# Patient Record
Sex: FEMALE | Race: BLACK OR AFRICAN AMERICAN | Employment: FULL TIME | URBAN - NONMETROPOLITAN AREA
[De-identification: names, ages, dates, MRNs, and addresses within clinical notes are randomized per-mention and may not be internally consistent; named-entity substitution may affect disease eponyms.]

---

## 2017-10-20 ENCOUNTER — APPOINTMENT (OUTPATIENT)
Dept: ULTRASOUND IMAGING | Age: 37
End: 2017-10-20
Payer: COMMERCIAL

## 2017-10-20 ENCOUNTER — HOSPITAL ENCOUNTER (EMERGENCY)
Age: 37
Discharge: HOME | End: 2017-10-20
Attending: INTERNAL MEDICINE
Payer: COMMERCIAL

## 2017-10-20 VITALS
RESPIRATION RATE: 16 BRPM | TEMPERATURE: 98.2 F | HEIGHT: 68 IN | OXYGEN SATURATION: 100 % | HEART RATE: 74 BPM | BODY MASS INDEX: 36.22 KG/M2 | SYSTOLIC BLOOD PRESSURE: 133 MMHG | DIASTOLIC BLOOD PRESSURE: 86 MMHG | WEIGHT: 239 LBS

## 2017-10-20 DIAGNOSIS — N89.8 VAGINAL DISCHARGE: Primary | ICD-10-CM

## 2017-10-20 LAB
ANION GAP SERPL CALCULATED.3IONS-SCNC: 12 MEQ/L (ref 8–16)
BASOPHILS # BLD: 0.9 %
BASOPHILS ABSOLUTE: 0.1 THOU/MM3 (ref 0–0.1)
BILIRUBIN URINE: NEGATIVE
BLOOD, URINE: NEGATIVE
BUN BLDV-MCNC: 8 MG/DL (ref 7–22)
CALCIUM SERPL-MCNC: 8.9 MG/DL (ref 8.5–10.5)
CHARACTER, URINE: CLEAR
CHLAMYDIA TRACHOMATIS BY RT-PCR: NOT DETECTED
CHLORIDE BLD-SCNC: 104 MEQ/L (ref 98–111)
CO2: 24 MEQ/L (ref 23–33)
COLOR: YELLOW
CREAT SERPL-MCNC: 0.8 MG/DL (ref 0.4–1.2)
CT/NG SOURCE: NORMAL
EOSINOPHIL # BLD: 1.4 %
EOSINOPHILS ABSOLUTE: 0.1 THOU/MM3 (ref 0–0.4)
GFR SERPL CREATININE-BSD FRML MDRD: > 90 ML/MIN/1.73M2
GLUCOSE BLD-MCNC: 85 MG/DL (ref 70–108)
GLUCOSE, URINE: NEGATIVE MG/DL
HCT VFR BLD CALC: 38.8 % (ref 37–47)
HEMOGLOBIN: 13.2 GM/DL (ref 12–16)
KETONES, URINE: NEGATIVE
KOH PREP: NORMAL
LEUKOCYTE EST, POC: NEGATIVE
LYMPHOCYTES # BLD: 41.9 %
LYMPHOCYTES ABSOLUTE: 3.1 THOU/MM3 (ref 1–4.8)
MCH RBC QN AUTO: 30.4 PG (ref 27–31)
MCHC RBC AUTO-ENTMCNC: 34.1 GM/DL (ref 33–37)
MCV RBC AUTO: 89.2 FL (ref 81–99)
MONOCYTES # BLD: 9.7 %
MONOCYTES ABSOLUTE: 0.7 THOU/MM3 (ref 0.4–1.3)
NEISSERIA GONORRHOEAE BY RT-PCR: NOT DETECTED
NITRITE, URINE: NEGATIVE
NUCLEATED RED BLOOD CELLS: 0 /100 WBC
OSMOLALITY CALCULATION: 277 MOSMOL/KG (ref 275–300)
PDW BLD-RTO: 14.1 % (ref 11.5–14.5)
PH UA: 7
PLATELET # BLD: 253 THOU/MM3 (ref 130–400)
PMV BLD AUTO: 9.2 MCM (ref 7.4–10.4)
POTASSIUM SERPL-SCNC: 4.3 MEQ/L (ref 3.5–5.2)
PREGNANCY, SERUM: NEGATIVE
PROTEIN UA: NEGATIVE MG/DL
RBC # BLD: 4.34 MILL/MM3 (ref 4.2–5.4)
RBC # BLD: NORMAL 10*6/UL
SEG NEUTROPHILS: 46.1 %
SEGMENTED NEUTROPHILS ABSOLUTE COUNT: 3.4 THOU/MM3 (ref 1.8–7.7)
SODIUM BLD-SCNC: 140 MEQ/L (ref 135–145)
SPECIFIC GRAVITY UA: 1.02 (ref 1–1.03)
TRICHOMONAS PREP: NORMAL
UROBILINOGEN, URINE: 0.2 EU/DL
WBC # BLD: 7.3 THOU/MM3 (ref 4.8–10.8)

## 2017-10-20 PROCEDURE — 85025 COMPLETE CBC W/AUTO DIFF WBC: CPT

## 2017-10-20 PROCEDURE — 80048 BASIC METABOLIC PNL TOTAL CA: CPT

## 2017-10-20 PROCEDURE — 81003 URINALYSIS AUTO W/O SCOPE: CPT

## 2017-10-20 PROCEDURE — 99284 EMERGENCY DEPT VISIT MOD MDM: CPT

## 2017-10-20 PROCEDURE — 76830 TRANSVAGINAL US NON-OB: CPT

## 2017-10-20 PROCEDURE — 87491 CHLMYD TRACH DNA AMP PROBE: CPT

## 2017-10-20 PROCEDURE — 87591 N.GONORRHOEAE DNA AMP PROB: CPT

## 2017-10-20 PROCEDURE — 87086 URINE CULTURE/COLONY COUNT: CPT

## 2017-10-20 PROCEDURE — 6370000000 HC RX 637 (ALT 250 FOR IP): Performed by: INTERNAL MEDICINE

## 2017-10-20 PROCEDURE — 84703 CHORIONIC GONADOTROPIN ASSAY: CPT

## 2017-10-20 PROCEDURE — 87220 TISSUE EXAM FOR FUNGI: CPT

## 2017-10-20 PROCEDURE — 36415 COLL VENOUS BLD VENIPUNCTURE: CPT

## 2017-10-20 PROCEDURE — 87210 SMEAR WET MOUNT SALINE/INK: CPT

## 2017-10-20 RX ORDER — METRONIDAZOLE 500 MG/1
500 TABLET ORAL 2 TIMES DAILY
Qty: 20 TABLET | Refills: 0 | Status: SHIPPED | OUTPATIENT
Start: 2017-10-20 | End: 2017-10-30

## 2017-10-20 RX ORDER — METRONIDAZOLE 500 MG/1
500 TABLET ORAL ONCE
Status: COMPLETED | OUTPATIENT
Start: 2017-10-20 | End: 2017-10-20

## 2017-10-20 RX ADMIN — METRONIDAZOLE 500 MG: 500 TABLET ORAL at 15:27

## 2017-10-20 ASSESSMENT — ENCOUNTER SYMPTOMS
SORE THROAT: 0
COUGH: 0
VOMITING: 0
EYE PAIN: 0
DIARRHEA: 0
WHEEZING: 0
BACK PAIN: 0
SHORTNESS OF BREATH: 0
RHINORRHEA: 0
EYE DISCHARGE: 0
NAUSEA: 0
ABDOMINAL PAIN: 1

## 2017-10-20 ASSESSMENT — PAIN DESCRIPTION - DESCRIPTORS
DESCRIPTORS: CRAMPING
DESCRIPTORS: PRESSURE;TIGHTNESS

## 2017-10-20 ASSESSMENT — PAIN DESCRIPTION - ORIENTATION: ORIENTATION: LEFT;RIGHT;LOWER

## 2017-10-20 ASSESSMENT — PAIN DESCRIPTION - FREQUENCY
FREQUENCY: CONTINUOUS
FREQUENCY: CONTINUOUS

## 2017-10-20 ASSESSMENT — PAIN DESCRIPTION - LOCATION
LOCATION: VAGINA
LOCATION: ABDOMEN

## 2017-10-20 ASSESSMENT — PAIN DESCRIPTION - PAIN TYPE: TYPE: ACUTE PAIN

## 2017-10-20 ASSESSMENT — PAIN SCALES - GENERAL
PAINLEVEL_OUTOF10: 6
PAINLEVEL_OUTOF10: 7

## 2017-10-20 NOTE — LETTER
[facility] EMERGENCY DEPT  [address]  [address] [place] [id number]  Phone: [phone]               October 20, 2017    Patient: Gina Chavarria   YOB: 1980   Date of Visit: 10/20/2017       To Whom It May Concern:    Iva Jolley was seen and treated in our emergency department on 10/20/2017. She may return to work on 10/21/2017.       Sincerely,       Aspen Chacko RN         Signature:__________________________________

## 2017-10-20 NOTE — ED PROVIDER NOTES
light-headedness and headaches. Hematological: Negative for adenopathy. Psychiatric/Behavioral: Negative for confusion, dysphoric mood and suicidal ideas. The patient is not nervous/anxious. PAST MEDICAL HISTORY    has no past medical history on file. SURGICAL HISTORY      has a past surgical history that includes Tubal ligation () and  section (). CURRENT MEDICATIONS       Previous Medications    IBUPROFEN (ADVIL;MOTRIN) 200 MG TABLET    Take 200 mg by mouth every 6 hours as needed for Pain       ALLERGIES     has No Known Allergies. FAMILY HISTORY     indicated that her mother is alive. She indicated that her father is alive. She indicated that her sister is alive. She indicated that her maternal grandmother is alive. She indicated that her maternal grandfather is . She indicated that her paternal grandmother is . She indicated that her paternal grandfather is alive. family history includes Cancer in her paternal grandmother; Cancer (age of onset: 54) in her maternal grandfather; Diabetes (age of onset: 36) in her mother; Heart Attack in her paternal grandmother; Heart Attack (age of onset: 36) in her father; Heart Disease in her paternal grandmother; High Blood Pressure in her mother. SOCIAL HISTORY      reports that she has been smoking Cigarettes. She has a 3.00 pack-year smoking history. She has never used smokeless tobacco. She reports that she drinks alcohol. She reports that she does not use drugs. PHYSICAL EXAM     INITIAL VITALS:  height is 5' 8\" (1.727 m) and weight is 239 lb (108.4 kg). Her oral temperature is 98.2 °F (36.8 °C). Her blood pressure is 133/86 and her pulse is 74. Her respiration is 16 and oxygen saturation is 100%. Physical Exam   Constitutional: She is oriented to person, place, and time. She appears well-developed and well-nourished. HENT:   Head: Normocephalic and atraumatic.    Right Ear: External ear normal. anechoic and uncomplicated appearing and therefore likely physiologic. **This report has been created using voice recognition software. It may contain minor errors which are inherent in voice recognition technology. **      Final report electronically signed by Dr. Javi Ramos on 10/20/2017 2:22 PM            LABS:   Labs Reviewed   [address] [facility]  JADYN Rojo    Narrative:     Source: vaginal non-OB patient       Site:           Current Antibiotics: none   WET PREP, GENITAL    Narrative:     Source: vaginal non-OB patient       Site:           Current Antibiotics: none   C. TRACHOMATIS / N. GONORRHOEAE, DNA   URINE CULTURE   CBC WITH AUTO DIFFERENTIAL   BASIC METABOLIC PANEL   HCG, SERUM, QUALITATIVE   URINALYSIS   ANION GAP   OSMOLALITY   GLOMERULAR FILTRATION RATE, ESTIMATED       EMERGENCY DEPARTMENT COURSE:   Vitals:    Vitals:    10/20/17 1210 10/20/17 1213 10/20/17 1320 10/20/17 1448   BP:  (!) 154/90 (!) 159/91 133/86   Pulse:  65 68 74   Resp:  14 12 16   Temp:  98.2 °F (36.8 °C)     TempSrc:  Oral     SpO2:  100% 99% 100%   Weight: 239 lb (108.4 kg)      Height: 5' 8\" (1.727 m)          12:23 PM: The patient was seen and evaluated. 12:29 PM: A female nurse was present during the patient's  examination. MDM:  Patient was given Flagyl in the emergency department based on suspicion of bacterial vaginosis. Patient was advised not to drink alcohol. Patient ultrasound of the labs were also reviewed. Patient is advised to follow-up with [address] [place] [address] on Monday. She is also advised to come back if symptoms get worse. All of patient's lab the testing that reviewed discussed with the patient. CRITICAL CARE:   None. CONSULTS:      PROCEDURES:  None. FINAL IMPRESSION      1.  Vaginal discharge          DISPOSITION/PLAN   Discharge    PATIENT REFERRED TO:  [address] [place] [address] [address]  [address] [address] [phone]    Go in 3

## 2017-10-20 NOTE — ED NOTES
Patient states since Wednesday she has been having a lot of vaginal discharge and vaginal odor. Patient states she has never odor like this before and it is embarrassing. Patient assessed. Dr. Christoper Denver at bedside. Bed locked and in lowest position. Call light within reach.       [address] ROLLINS RN  10/20/17 6289

## 2017-10-20 NOTE — ED NOTES
Updated Dr. Ileana Queen on patient need for pain intervention. No new orders received.       Kevin Aragon RN  10/20/17 6756

## 2017-10-21 LAB
ORGANISM: ABNORMAL
URINE CULTURE, ROUTINE: ABNORMAL

## 2018-02-09 ENCOUNTER — HOSPITAL ENCOUNTER (EMERGENCY)
Age: 38
Discharge: HOME | End: 2018-02-09
Payer: COMMERCIAL

## 2018-02-09 VITALS
HEART RATE: 80 BPM | DIASTOLIC BLOOD PRESSURE: 98 MMHG | BODY MASS INDEX: 36.37 KG/M2 | TEMPERATURE: 99.1 F | HEIGHT: 68 IN | OXYGEN SATURATION: 100 % | WEIGHT: 240 LBS | SYSTOLIC BLOOD PRESSURE: 173 MMHG | RESPIRATION RATE: 16 BRPM

## 2018-02-09 DIAGNOSIS — J10.1 INFLUENZA A: Primary | ICD-10-CM

## 2018-02-09 LAB
FLU A ANTIGEN: POSITIVE
FLU B ANTIGEN: NEGATIVE

## 2018-02-09 PROCEDURE — 99213 OFFICE O/P EST LOW 20 MIN: CPT | Performed by: NURSE PRACTITIONER

## 2018-02-09 PROCEDURE — 99214 OFFICE O/P EST MOD 30 MIN: CPT

## 2018-02-09 PROCEDURE — 87804 INFLUENZA ASSAY W/OPTIC: CPT

## 2018-02-09 RX ORDER — OSELTAMIVIR PHOSPHATE 75 MG/1
75 CAPSULE ORAL 2 TIMES DAILY
Qty: 10 CAPSULE | Refills: 0 | Status: SHIPPED | OUTPATIENT
Start: 2018-02-09 | End: 2018-02-14

## 2018-02-09 RX ORDER — OSELTAMIVIR PHOSPHATE 75 MG/1
75 CAPSULE ORAL 2 TIMES DAILY
Qty: 10 CAPSULE | Refills: 0 | Status: SHIPPED | OUTPATIENT
Start: 2018-02-09 | End: 2018-02-09

## 2018-02-09 ASSESSMENT — ENCOUNTER SYMPTOMS
RHINORRHEA: 0
SHORTNESS OF BREATH: 1
SORE THROAT: 1
COUGH: 0
SINUS PAIN: 0
VOMITING: 0
DIARRHEA: 1
CHEST TIGHTNESS: 0
NAUSEA: 1
SINUS PRESSURE: 1

## 2018-02-09 ASSESSMENT — PAIN SCALES - GENERAL: PAINLEVEL_OUTOF10: 4

## 2018-02-09 ASSESSMENT — PAIN DESCRIPTION - LOCATION: LOCATION: ABDOMEN;THROAT

## 2018-02-09 NOTE — ED PROVIDER NOTES
TABLET    Take 200 mg by mouth every 6 hours as needed for Pain    NAPROXEN SODIUM (ALEVE PO)    Take by mouth       ALLERGIES     Patient is has No Known Allergies. Patients   Immunization History   Administered Date(s) Administered    Td 07/05/2016       FAMILY HISTORY     Patient's family history includes Cancer in her paternal grandmother; Cancer (age of onset: 54) in her maternal grandfather; Diabetes (age of onset: 36) in her mother; Heart Attack in her paternal grandmother; Heart Attack (age of onset: 36) in her father; Heart Disease in her paternal grandmother; High Blood Pressure in her mother. SOCIAL HISTORY     Patient  reports that she has been smoking Cigarettes. She has a 3.00 pack-year smoking history. She has never used smokeless tobacco. She reports that she drinks alcohol. She reports that she does not use drugs. PHYSICAL EXAM     ED TRIAGE VITALS  BP: (!) 173/98, Temp: 99.1 °F (37.3 °C), Pulse: 80, Resp: 16, SpO2: 100 %,Estimated body mass index is 36.49 kg/m² as calculated from the following:    Height as of this encounter: 5' 8\" (1.727 m). Weight as of this encounter: 240 lb (108.9 kg). ,Patient's last menstrual period was 02/05/2018. Physical Exam   Constitutional: She is oriented to person, place, and time. She appears well-developed and well-nourished. She is cooperative. She appears ill. HENT:   Head: Normocephalic and atraumatic. Right Ear: Hearing, tympanic membrane, external ear and ear canal normal.   Left Ear: Hearing, tympanic membrane, external ear and ear canal normal.   Nose: Right sinus exhibits no maxillary sinus tenderness and no frontal sinus tenderness. Left sinus exhibits no maxillary sinus tenderness and no frontal sinus tenderness. Mouth/Throat: Uvula is midline and mucous membranes are normal. Posterior oropharyngeal erythema present. No oropharyngeal exudate, posterior oropharyngeal edema or tonsillar abscesses. Neck: Neck supple.    Cardiovascular:

## 2018-02-12 ENCOUNTER — TELEPHONE (OUTPATIENT)
Dept: INTERNAL MEDICINE CLINIC | Age: 38
End: 2018-02-12

## 2018-02-12 ENCOUNTER — HOSPITAL ENCOUNTER (EMERGENCY)
Age: 38
Discharge: HOME | End: 2018-02-12
Attending: EMERGENCY MEDICINE
Payer: COMMERCIAL

## 2018-02-12 ENCOUNTER — APPOINTMENT (OUTPATIENT)
Dept: GENERAL RADIOLOGY | Age: 38
End: 2018-02-12
Payer: COMMERCIAL

## 2018-02-12 VITALS
DIASTOLIC BLOOD PRESSURE: 78 MMHG | WEIGHT: 242 LBS | BODY MASS INDEX: 36.68 KG/M2 | HEART RATE: 67 BPM | TEMPERATURE: 98.6 F | SYSTOLIC BLOOD PRESSURE: 122 MMHG | RESPIRATION RATE: 16 BRPM | OXYGEN SATURATION: 97 % | HEIGHT: 68 IN

## 2018-02-12 DIAGNOSIS — M70.52 INFRAPATELLAR BURSITIS OF LEFT KNEE: ICD-10-CM

## 2018-02-12 DIAGNOSIS — R03.0 ELEVATED BLOOD PRESSURE READING: ICD-10-CM

## 2018-02-12 DIAGNOSIS — J06.9 UPPER RESPIRATORY TRACT INFECTION, UNSPECIFIED TYPE: ICD-10-CM

## 2018-02-12 DIAGNOSIS — M17.12 PRIMARY OSTEOARTHRITIS OF LEFT KNEE: Primary | ICD-10-CM

## 2018-02-12 DIAGNOSIS — J10.1 INFLUENZA A: ICD-10-CM

## 2018-02-12 PROCEDURE — 99283 EMERGENCY DEPT VISIT LOW MDM: CPT

## 2018-02-12 PROCEDURE — 6370000000 HC RX 637 (ALT 250 FOR IP): Performed by: EMERGENCY MEDICINE

## 2018-02-12 PROCEDURE — 73564 X-RAY EXAM KNEE 4 OR MORE: CPT

## 2018-02-12 PROCEDURE — 71046 X-RAY EXAM CHEST 2 VIEWS: CPT

## 2018-02-12 RX ORDER — IBUPROFEN 800 MG/1
800 TABLET ORAL ONCE
Status: COMPLETED | OUTPATIENT
Start: 2018-02-12 | End: 2018-02-12

## 2018-02-12 RX ORDER — ETODOLAC 400 MG/1
400 TABLET, FILM COATED ORAL 3 TIMES DAILY PRN
Qty: 30 TABLET | Refills: 0 | Status: SHIPPED | OUTPATIENT
Start: 2018-02-12 | End: 2018-12-02

## 2018-02-12 RX ADMIN — IBUPROFEN 800 MG: 800 TABLET, FILM COATED ORAL at 12:27

## 2018-02-12 ASSESSMENT — ENCOUNTER SYMPTOMS
WHEEZING: 0
COUGH: 0
VOMITING: 0
DIARRHEA: 0
RHINORRHEA: 0
BACK PAIN: 0
EYE PAIN: 0
SORE THROAT: 0
SHORTNESS OF BREATH: 0
NAUSEA: 0
ABDOMINAL PAIN: 0
EYE DISCHARGE: 0

## 2018-02-12 ASSESSMENT — PAIN SCALES - GENERAL
PAINLEVEL_OUTOF10: 7
PAINLEVEL_OUTOF10: 5
PAINLEVEL_OUTOF10: 7

## 2018-02-12 ASSESSMENT — PAIN DESCRIPTION - LOCATION
LOCATION: KNEE
LOCATION: KNEE;GENERALIZED

## 2018-02-12 ASSESSMENT — PAIN DESCRIPTION - ORIENTATION
ORIENTATION: LEFT
ORIENTATION: LEFT

## 2018-02-12 ASSESSMENT — PAIN DESCRIPTION - PAIN TYPE: TYPE: CHRONIC PAIN

## 2018-02-12 ASSESSMENT — PAIN DESCRIPTION - FREQUENCY: FREQUENCY: CONTINUOUS

## 2018-02-12 ASSESSMENT — PAIN DESCRIPTION - DESCRIPTORS: DESCRIPTORS: SHOOTING;TIGHTNESS

## 2018-02-12 NOTE — ED PROVIDER NOTES
[address] [address]   eMERGENCY dEPARTMENT eNCOUnter        [address] [facility]    Chief Complaint   Patient presents with    Influenza     Dx friday at urgent care    Knee Pain     Left       Nurses Notes reviewed and I agree except as noted in the HPI. HPI    Bibi Hurst is a 44 y.o. female who presents for evaluation of multiple complaints. The patient tested positive for influenza A this past week on 2/09. She has been taking the Tamiflu as prescribed. She presents today with continued congestion. She denies chest pain. She additionally complains of left knee pain which began 4 days ago. Patient injured this knee 10 years ago and has intermittently experienced pain since which she tolerates with taking Aleve. She was at work this past week and accidentally hit this knee on a heavy piece of machinery. Her pain has progressively worsened since. She is able to walk, but with increased pain. She rates her current pain at a 7/10 in severity. She describes the pain as constant and shooting/tight in character. She reports associated swelling. Patient has been applying ice to the knee in addition to taking the Aleve. There are no other complaints or symptoms at this time. REVIEW OF SYSTEMS    Review of Systems   Constitutional: Negative for appetite change, chills, fatigue and fever. HENT: Positive for congestion. Negative for ear pain, rhinorrhea and sore throat. Eyes: Negative for pain, discharge and visual disturbance. Respiratory: Negative for cough, shortness of breath and wheezing. Cardiovascular: Negative for chest pain, palpitations and leg swelling. Gastrointestinal: Negative for abdominal pain, diarrhea, nausea and vomiting. Genitourinary: Negative for difficulty urinating, dysuria and vaginal discharge. Musculoskeletal: Positive for arthralgias and joint swelling. Negative for back pain and neck pain. Skin: Negative for pallor and rash. process. **This report has been created using voice recognition software. It may contain minor errors which are inherent in voice recognition technology. **      Final report electronically signed by Dr. No Daniel on 2/12/2018 12:55 PM            Vitals:    Vitals:    02/12/18 1135 02/12/18 1321 02/12/18 1350   BP: (!) 156/99 122/78 122/78   Pulse: 67  67   Resp: 18  16   Temp: 98.6 °F (37 °C)     TempSrc: Oral     SpO2: 99%  97%   Weight: 242 lb (109.8 kg)     Height: 5' 8\" (1.727 m)         EMERGENCY DEPARTMENT COURSE:    Medications   ibuprofen (ADVIL;MOTRIN) tablet 800 mg (800 mg Oral Given 2/12/18 1227)     12:20 PM: Appropriate imaging studies have been ordered. Patient will be treated with Advil; Motrin for symptom control in the meantime. The pt was seen and evaluated by me. Within the department, I observed the pt's vital signs to be within acceptable range. Radiological studies were performed, results were reviewed with the patient. Within the department, the pt was treated with Ibuprofen. I observed the pt's pain condition to improve during the duration of their stay. I explained my proposed course of treatment to the pt, and they were amenable to my decision. Nurse applied ACE wrap to the knee. They were discharged home, and they will return to the ED if their symptoms become more severe in nature, or otherwise change. CRITICAL CARE:   None. CONSULTS:  None    PROCEDURES:  None. FINAL IMPRESSION       1. Primary osteoarthritis of left knee    2. Infrapatellar bursitis of left knee    3. Upper respiratory tract infection, unspecified type    4. Influenza A    5.  Elevated blood pressure reading          DISPOSITION/PLAN  PATIENT REFERRED TO:  [facility]    In 3 days      DISCHARGE MEDICATIONS:  Discharge Medication List as of 2/12/2018  1:38 PM      START taking these medications    Details   etodolac (LODINE) 400 MG tablet Take 1 tablet by mouth 3 times daily as

## 2018-10-24 ENCOUNTER — HOSPITAL ENCOUNTER (EMERGENCY)
Age: 38
Discharge: HOME | End: 2018-10-24
Payer: COMMERCIAL

## 2018-10-24 ENCOUNTER — APPOINTMENT (OUTPATIENT)
Dept: ULTRASOUND IMAGING | Age: 38
End: 2018-10-24
Payer: COMMERCIAL

## 2018-10-24 VITALS
TEMPERATURE: 99 F | WEIGHT: 232 LBS | HEIGHT: 68 IN | SYSTOLIC BLOOD PRESSURE: 133 MMHG | HEART RATE: 54 BPM | RESPIRATION RATE: 16 BRPM | DIASTOLIC BLOOD PRESSURE: 93 MMHG | OXYGEN SATURATION: 100 % | BODY MASS INDEX: 35.16 KG/M2

## 2018-10-24 DIAGNOSIS — N89.8 VAGINAL DISCHARGE: ICD-10-CM

## 2018-10-24 DIAGNOSIS — N83.202 LEFT OVARIAN CYST: Primary | ICD-10-CM

## 2018-10-24 LAB
ALBUMIN SERPL-MCNC: 4.1 G/DL (ref 3.5–5.1)
ALP BLD-CCNC: 59 U/L (ref 38–126)
ALT SERPL-CCNC: 12 U/L (ref 11–66)
ANION GAP SERPL CALCULATED.3IONS-SCNC: 11 MEQ/L (ref 8–16)
AST SERPL-CCNC: 16 U/L (ref 5–40)
BASOPHILS # BLD: 0.6 %
BASOPHILS ABSOLUTE: 0 THOU/MM3 (ref 0–0.1)
BILIRUB SERPL-MCNC: 0.4 MG/DL (ref 0.3–1.2)
BILIRUBIN DIRECT: < 0.2 MG/DL (ref 0–0.3)
BILIRUBIN URINE: NEGATIVE
BLOOD, URINE: NEGATIVE
BUN BLDV-MCNC: 10 MG/DL (ref 7–22)
CALCIUM SERPL-MCNC: 8.7 MG/DL (ref 8.5–10.5)
CHARACTER, URINE: CLEAR
CHLAMYDIA TRACHOMATIS BY RT-PCR: NOT DETECTED
CHLORIDE BLD-SCNC: 105 MEQ/L (ref 98–111)
CO2: 23 MEQ/L (ref 23–33)
COLOR: YELLOW
CREAT SERPL-MCNC: 0.7 MG/DL (ref 0.4–1.2)
CT/NG SOURCE: NORMAL
EOSINOPHIL # BLD: 1.1 %
EOSINOPHILS ABSOLUTE: 0.1 THOU/MM3 (ref 0–0.4)
ERYTHROCYTE [DISTWIDTH] IN BLOOD BY AUTOMATED COUNT: 13.3 % (ref 11.5–14.5)
ERYTHROCYTE [DISTWIDTH] IN BLOOD BY AUTOMATED COUNT: 43.7 FL (ref 35–45)
GFR SERPL CREATININE-BSD FRML MDRD: > 90 ML/MIN/1.73M2
GLUCOSE BLD-MCNC: 98 MG/DL (ref 70–108)
GLUCOSE URINE: NEGATIVE MG/DL
HCT VFR BLD CALC: 37.5 % (ref 37–47)
HEMOGLOBIN: 12.8 GM/DL (ref 12–16)
IMMATURE GRANS (ABS): 0.01 THOU/MM3 (ref 0–0.07)
IMMATURE GRANULOCYTES: 0.2 %
KETONES, URINE: NEGATIVE
KOH PREP: NORMAL
LEUKOCYTE ESTERASE, URINE: NEGATIVE
LIPASE: 16.7 U/L (ref 5.6–51.3)
LYMPHOCYTES # BLD: 32 %
LYMPHOCYTES ABSOLUTE: 2.1 THOU/MM3 (ref 1–4.8)
MCH RBC QN AUTO: 30.3 PG (ref 26–33)
MCHC RBC AUTO-ENTMCNC: 34.1 GM/DL (ref 32.2–35.5)
MCV RBC AUTO: 88.9 FL (ref 81–99)
MONOCYTES # BLD: 7.5 %
MONOCYTES ABSOLUTE: 0.5 THOU/MM3 (ref 0.4–1.3)
NEISSERIA GONORRHOEAE BY RT-PCR: NOT DETECTED
NITRITE, URINE: NEGATIVE
NUCLEATED RED BLOOD CELLS: 0 /100 WBC
OSMOLALITY CALCULATION: 276.6 MOSMOL/KG (ref 275–300)
PH UA: 6
PLATELET # BLD: 264 THOU/MM3 (ref 130–400)
PMV BLD AUTO: 10.7 FL (ref 9.4–12.4)
POTASSIUM SERPL-SCNC: 4.4 MEQ/L (ref 3.5–5.2)
PREGNANCY, SERUM: NEGATIVE
PROTEIN UA: NEGATIVE
RBC # BLD: 4.22 MILL/MM3 (ref 4.2–5.4)
SEG NEUTROPHILS: 58.6 %
SEGMENTED NEUTROPHILS ABSOLUTE COUNT: 3.8 THOU/MM3 (ref 1.8–7.7)
SODIUM BLD-SCNC: 139 MEQ/L (ref 135–145)
SPECIFIC GRAVITY, URINE: 1.02 (ref 1–1.03)
TOTAL PROTEIN: 6.9 G/DL (ref 6.1–8)
TRICHOMONAS PREP: NORMAL
UROBILINOGEN, URINE: 0.2 EU/DL
WBC # BLD: 6.5 THOU/MM3 (ref 4.8–10.8)

## 2018-10-24 PROCEDURE — 84703 CHORIONIC GONADOTROPIN ASSAY: CPT

## 2018-10-24 PROCEDURE — 99284 EMERGENCY DEPT VISIT MOD MDM: CPT

## 2018-10-24 PROCEDURE — 87210 SMEAR WET MOUNT SALINE/INK: CPT

## 2018-10-24 PROCEDURE — 96372 THER/PROPH/DIAG INJ SC/IM: CPT

## 2018-10-24 PROCEDURE — 80053 COMPREHEN METABOLIC PANEL: CPT

## 2018-10-24 PROCEDURE — 87220 TISSUE EXAM FOR FUNGI: CPT

## 2018-10-24 PROCEDURE — 87491 CHLMYD TRACH DNA AMP PROBE: CPT

## 2018-10-24 PROCEDURE — 83690 ASSAY OF LIPASE: CPT

## 2018-10-24 PROCEDURE — 87591 N.GONORRHOEAE DNA AMP PROB: CPT

## 2018-10-24 PROCEDURE — 87205 SMEAR GRAM STAIN: CPT

## 2018-10-24 PROCEDURE — 6360000002 HC RX W HCPCS: Performed by: PHYSICIAN ASSISTANT

## 2018-10-24 PROCEDURE — 6370000000 HC RX 637 (ALT 250 FOR IP): Performed by: PHYSICIAN ASSISTANT

## 2018-10-24 PROCEDURE — 85025 COMPLETE CBC W/AUTO DIFF WBC: CPT

## 2018-10-24 PROCEDURE — 87070 CULTURE OTHR SPECIMN AEROBIC: CPT

## 2018-10-24 PROCEDURE — 76830 TRANSVAGINAL US NON-OB: CPT

## 2018-10-24 PROCEDURE — 2709999900 HC NON-CHARGEABLE SUPPLY

## 2018-10-24 PROCEDURE — 81003 URINALYSIS AUTO W/O SCOPE: CPT

## 2018-10-24 PROCEDURE — 82248 BILIRUBIN DIRECT: CPT

## 2018-10-24 PROCEDURE — 36415 COLL VENOUS BLD VENIPUNCTURE: CPT

## 2018-10-24 PROCEDURE — 2500000003 HC RX 250 WO HCPCS: Performed by: PHYSICIAN ASSISTANT

## 2018-10-24 RX ORDER — AZITHROMYCIN 250 MG/1
1000 TABLET, FILM COATED ORAL ONCE
Status: COMPLETED | OUTPATIENT
Start: 2018-10-24 | End: 2018-10-24

## 2018-10-24 RX ORDER — IBUPROFEN 800 MG/1
800 TABLET ORAL ONCE
Status: COMPLETED | OUTPATIENT
Start: 2018-10-24 | End: 2018-10-24

## 2018-10-24 RX ORDER — IBUPROFEN 600 MG/1
600 TABLET ORAL EVERY 6 HOURS PRN
Qty: 30 TABLET | Refills: 0 | Status: SHIPPED | OUTPATIENT
Start: 2018-10-24 | End: 2018-12-19

## 2018-10-24 RX ORDER — METRONIDAZOLE 500 MG/1
2000 TABLET ORAL ONCE
Status: COMPLETED | OUTPATIENT
Start: 2018-10-24 | End: 2018-10-24

## 2018-10-24 RX ADMIN — AZITHROMYCIN 1000 MG: 250 TABLET, FILM COATED ORAL at 14:03

## 2018-10-24 RX ADMIN — METRONIDAZOLE 2000 MG: 500 TABLET, FILM COATED ORAL at 14:03

## 2018-10-24 RX ADMIN — LIDOCAINE HYDROCHLORIDE 250 MG: 10 INJECTION, SOLUTION EPIDURAL; INFILTRATION; INTRACAUDAL; PERINEURAL at 14:03

## 2018-10-24 RX ADMIN — IBUPROFEN 800 MG: 800 TABLET ORAL at 12:16

## 2018-10-24 ASSESSMENT — ENCOUNTER SYMPTOMS
RHINORRHEA: 0
COLOR CHANGE: 0
NAUSEA: 0
BACK PAIN: 0
SORE THROAT: 0
WHEEZING: 0
EYE DISCHARGE: 0
VOMITING: 0
EYE REDNESS: 0
SHORTNESS OF BREATH: 0
ABDOMINAL PAIN: 1
CONSTIPATION: 0
DIARRHEA: 0
COUGH: 0

## 2018-10-24 ASSESSMENT — PAIN DESCRIPTION - FREQUENCY
FREQUENCY: INTERMITTENT
FREQUENCY: INTERMITTENT

## 2018-10-24 ASSESSMENT — PAIN DESCRIPTION - LOCATION
LOCATION: PELVIS
LOCATION: PELVIS

## 2018-10-24 ASSESSMENT — PAIN DESCRIPTION - DESCRIPTORS
DESCRIPTORS: CRAMPING
DESCRIPTORS: CRAMPING

## 2018-10-24 ASSESSMENT — PAIN SCALES - GENERAL
PAINLEVEL_OUTOF10: 5
PAINLEVEL_OUTOF10: 5
PAINLEVEL_OUTOF10: 8

## 2018-10-24 ASSESSMENT — PAIN DESCRIPTION - PAIN TYPE
TYPE: ACUTE PAIN
TYPE: ACUTE PAIN

## 2018-10-24 ASSESSMENT — PAIN DESCRIPTION - ORIENTATION
ORIENTATION: LEFT
ORIENTATION: LEFT

## 2018-10-24 NOTE — ED PROVIDER NOTES
[address] [facility] [address] [address] EMERGENCY DEPT      CHIEF COMPLAINT       Chief Complaint   Patient presents with    Vaginal Discharge    Pelvic Pain       Nurses Notes reviewed and I agreeexcept as noted in the HPI. HISTORY OF PRESENT ILLNESS    Darryle Gab is a 43 y.o. female who presents to the ED for the evaluation of vaginal discharge. Patient states that 4 days ago, she began to have lower abdominal cramping which she states was similar to her menstrual cramping. She states that she had sexual intercourse with her fiance 2 days ago and states that it had been 3-4 months since her last intercourse. She denies sexual activity with any other partners. She states that she douched before intercourse. She states that after intercourse, she noticed brown and white vaginal discharge on her fiance as well as on the bed. She also reports noting that the discharge had a foul odor. The patient states that she tried another douche yesterday with resolution of the vaginal discharge discharge and foul odor. She reports continued left-sided pelvic pain. She denies fever, chills, chest pain, shortness of breath, nausea, vomiting, diarrhea, constipation, or vaginal bleeding. She states that her LMP was at the end of September or the beginning of October. She states that she called her OBGYN, Dr. Marroquin, and has an appointment on 10/30/18. She has no additional complaints at this time. REVIEW OF SYSTEMS     Review of Systems   Constitutional: Negative for chills, fatigue and fever. HENT: Negative for congestion, ear pain, rhinorrhea and sore throat. Eyes: Negative for discharge and redness. Respiratory: Negative for cough, shortness of breath and wheezing. Cardiovascular: Negative for chest pain and palpitations. Gastrointestinal: Positive for abdominal pain (cramping ). Negative for constipation, diarrhea, nausea and vomiting. Genitourinary: Positive for pelvic pain and vaginal discharge.  Negative for decreased urine volume, difficulty urinating, dysuria, flank pain, frequency, hematuria, menstrual problem, urgency, vaginal bleeding and vaginal pain. Musculoskeletal: Negative for arthralgias, back pain, myalgias, neck pain and neck stiffness. Skin: Negative for color change, pallor and rash. Neurological: Negative for dizziness, syncope, weakness, light-headedness, numbness and headaches. Hematological: Negative for adenopathy. Psychiatric/Behavioral: Negative for agitation, confusion, dysphoric mood and suicidal ideas. The patient is not nervous/anxious. PAST MEDICAL HISTORY    has no past medical history on file. SURGICAL HISTORY      has a past surgical history that includes Tubal ligation () and  section (). CURRENT MEDICATIONS       Previous Medications    DIPHENHYDRAMINE HCL (BENADRYL ALLERGY PO)    Take by mouth    ETODOLAC (LODINE) 400 MG TABLET    Take 1 tablet by mouth 3 times daily as needed (knee pain and swelling arthritis)       ALLERGIES     has No Known Allergies. FAMILY HISTORY     indicated that her mother is alive. She indicated that her father is alive. She indicated that her sister is alive. She indicated that her maternal grandmother is alive. She indicated that her maternal grandfather is . She indicated that her paternal grandmother is . She indicated that her paternal grandfather is alive. family history includes Cancer in her paternal grandmother; Cancer (age of onset: 54) in her maternal grandfather; Diabetes (age of onset: 36) in her mother; Heart Attack in her paternal grandmother; Heart Attack (age of onset: 36) in her father; Heart Disease in her paternal grandmother; High Blood Pressure in her mother. SOCIAL HISTORY    reports that she has been smoking Cigarettes. She has a 3.00 pack-year smoking history. She has never used smokeless tobacco. She reports that she drinks alcohol.  She reports that she does not use BASIC METABOLIC PANEL   HEPATIC FUNCTION PANEL   LIPASE   HCG, SERUM, QUALITATIVE   URINE RT REFLEX TO CULTURE   ANION GAP   GLOMERULAR FILTRATION RATE, ESTIMATED   OSMOLALITY       EMERGENCY DEPARTMENT COURSE:   Vitals:    Vitals:    10/24/18 1042   BP: (!) 151/99   Pulse: 71   Resp: 18   Temp: 99 °F (37.2 °C)   TempSrc: Oral   SpO2: 100%   Weight: 232 lb (105.2 kg)   Height: 5' 8\" (1.727 m)     The patient was seen and evaluated within the ED today with pelvic pain and vaginal discharge. Within the department, I observed the patient's vital signs to be within acceptable range, and she was afebrile. On exam, I appreciated no abdominal tenderness. There was scant white vaginal discharge on her pelvic exam without active bleeding. There was mild left adnexal tenderness without a mass noted. US studies within the department revealed a 2 x 1.5 x 1.5 cm complex left ovarian follicle/cyst.  Laboratory work was reassuring. Within the department, the patient was treated with Ibuprofen. KOH and wet prep swabs were negative. Chlamydia, gonorrhea, and BV testing is pending. Patient asked to be prophylactically treated, so she was administered IM Rocephin, po Zithromax, and po Flagyl. I observed the patient's condition to improve during the duration of the stay. I explained my proposed course of treatment to the patient, who was amenable to my treatment and discharge decisions. She was discharged home in stable condition with prescriptions for ibuprofen, and the patient will return to the ED if the symptoms become more severe in nature or otherwise change. She will otherwise follow up with Dr. Estela Bui, as instructed. I estimate there is LOW risk for ACUTE APPENDICITIS, BOWEL OBSTRUCTION, CHOLECYSTITIS, DIVERTICULITIS, INCARCERATED HERNIA, PANCREATITIS, PELVIC INFLAMMATORY DISEASE, PERFORATED BOWEL or ULCER, ECTOPIC PREGNANCY, or TUBO-OVARIAN ABSCESS, thus I consider the discharge disposition reasonable.  Also, was dictated to the scribe in my presence, and it accurately records my words and actions.     Lisa Jerez PA-C 10/24/18 1:50 PM    JOSÉ MIGUEL Maciel PA-C  10/24/18 [id number]

## 2018-10-24 NOTE — ED NOTES
Pt medicated per orders. Given crackers and peanut butter. Advised of shot time.        Valerie Ruffin RN  10/24/18 4102

## 2018-10-27 LAB
GENITAL CULTURE, ROUTINE: NORMAL
GRAM STAIN RESULT: NORMAL

## 2018-12-02 ENCOUNTER — APPOINTMENT (OUTPATIENT)
Dept: GENERAL RADIOLOGY | Age: 38
End: 2018-12-02
Payer: COMMERCIAL

## 2018-12-02 ENCOUNTER — HOSPITAL ENCOUNTER (EMERGENCY)
Age: 38
Discharge: HOME | End: 2018-12-02
Attending: FAMILY MEDICINE
Payer: COMMERCIAL

## 2018-12-02 VITALS
HEART RATE: 55 BPM | WEIGHT: 237 LBS | OXYGEN SATURATION: 100 % | TEMPERATURE: 98.9 F | SYSTOLIC BLOOD PRESSURE: 169 MMHG | RESPIRATION RATE: 16 BRPM | BODY MASS INDEX: 36.04 KG/M2 | DIASTOLIC BLOOD PRESSURE: 95 MMHG

## 2018-12-02 DIAGNOSIS — R00.1 SINUS BRADYCARDIA: ICD-10-CM

## 2018-12-02 DIAGNOSIS — S29.011A MUSCLE STRAIN OF CHEST WALL, INITIAL ENCOUNTER: Primary | ICD-10-CM

## 2018-12-02 DIAGNOSIS — Z72.0 TOBACCO ABUSE: ICD-10-CM

## 2018-12-02 DIAGNOSIS — R07.89 ATYPICAL CHEST PAIN: ICD-10-CM

## 2018-12-02 DIAGNOSIS — R06.00 DYSPNEA, UNSPECIFIED TYPE: ICD-10-CM

## 2018-12-02 DIAGNOSIS — S46.911A STRAIN OF RIGHT SHOULDER, INITIAL ENCOUNTER: ICD-10-CM

## 2018-12-02 DIAGNOSIS — R03.0 ELEVATED BLOOD PRESSURE READING: ICD-10-CM

## 2018-12-02 LAB
ALBUMIN SERPL-MCNC: 4.2 G/DL (ref 3.5–5.1)
ALP BLD-CCNC: 72 U/L (ref 38–126)
ALT SERPL-CCNC: 14 U/L (ref 11–66)
ANION GAP SERPL CALCULATED.3IONS-SCNC: 13 MEQ/L (ref 8–16)
APTT: 36 SECONDS (ref 22–38)
AST SERPL-CCNC: 17 U/L (ref 5–40)
BASOPHILS # BLD: 0.8 %
BASOPHILS ABSOLUTE: 0.1 THOU/MM3 (ref 0–0.1)
BILIRUB SERPL-MCNC: 0.2 MG/DL (ref 0.3–1.2)
BUN BLDV-MCNC: 13 MG/DL (ref 7–22)
CALCIUM SERPL-MCNC: 9.1 MG/DL (ref 8.5–10.5)
CHLORIDE BLD-SCNC: 104 MEQ/L (ref 98–111)
CO2: 24 MEQ/L (ref 23–33)
CREAT SERPL-MCNC: 0.9 MG/DL (ref 0.4–1.2)
EOSINOPHIL # BLD: 0.7 %
EOSINOPHILS ABSOLUTE: 0.1 THOU/MM3 (ref 0–0.4)
ERYTHROCYTE [DISTWIDTH] IN BLOOD BY AUTOMATED COUNT: 13.3 % (ref 11.5–14.5)
ERYTHROCYTE [DISTWIDTH] IN BLOOD BY AUTOMATED COUNT: 43.6 FL (ref 35–45)
ETHYL ALCOHOL, SERUM: < 0.01 %
GFR SERPL CREATININE-BSD FRML MDRD: 84 ML/MIN/1.73M2
GLUCOSE BLD-MCNC: 87 MG/DL (ref 70–108)
HCT VFR BLD CALC: 37.9 % (ref 37–47)
HEMOGLOBIN: 12.7 GM/DL (ref 12–16)
IMMATURE GRANS (ABS): 0.02 THOU/MM3 (ref 0–0.07)
IMMATURE GRANULOCYTES: 0.2 %
INR BLD: 1.19 (ref 0.85–1.13)
LYMPHOCYTES # BLD: 39.4 %
LYMPHOCYTES ABSOLUTE: 3.7 THOU/MM3 (ref 1–4.8)
MAGNESIUM: 1.9 MG/DL (ref 1.6–2.4)
MCH RBC QN AUTO: 30.1 PG (ref 26–33)
MCHC RBC AUTO-ENTMCNC: 33.5 GM/DL (ref 32.2–35.5)
MCV RBC AUTO: 89.8 FL (ref 81–99)
MONOCYTES # BLD: 5.8 %
MONOCYTES ABSOLUTE: 0.5 THOU/MM3 (ref 0.4–1.3)
NUCLEATED RED BLOOD CELLS: 0 /100 WBC
OSMOLALITY CALCULATION: 280.7 MOSMOL/KG (ref 275–300)
PLATELET # BLD: 304 THOU/MM3 (ref 130–400)
PMV BLD AUTO: 11 FL (ref 9.4–12.4)
POTASSIUM SERPL-SCNC: 3.2 MEQ/L (ref 3.5–5.2)
RBC # BLD: 4.22 MILL/MM3 (ref 4.2–5.4)
SEG NEUTROPHILS: 53.1 %
SEGMENTED NEUTROPHILS ABSOLUTE COUNT: 5 THOU/MM3 (ref 1.8–7.7)
SODIUM BLD-SCNC: 141 MEQ/L (ref 135–145)
TOTAL PROTEIN: 7.1 G/DL (ref 6.1–8)
TROPONIN T: < 0.01 NG/ML
WBC # BLD: 9.4 THOU/MM3 (ref 4.8–10.8)

## 2018-12-02 PROCEDURE — 36415 COLL VENOUS BLD VENIPUNCTURE: CPT

## 2018-12-02 PROCEDURE — 99215 OFFICE O/P EST HI 40 MIN: CPT

## 2018-12-02 PROCEDURE — 71046 X-RAY EXAM CHEST 2 VIEWS: CPT

## 2018-12-02 PROCEDURE — 99285 EMERGENCY DEPT VISIT HI MDM: CPT

## 2018-12-02 PROCEDURE — 80053 COMPREHEN METABOLIC PANEL: CPT

## 2018-12-02 PROCEDURE — 85610 PROTHROMBIN TIME: CPT

## 2018-12-02 PROCEDURE — 96372 THER/PROPH/DIAG INJ SC/IM: CPT

## 2018-12-02 PROCEDURE — 84484 ASSAY OF TROPONIN QUANT: CPT

## 2018-12-02 PROCEDURE — 85025 COMPLETE CBC W/AUTO DIFF WBC: CPT

## 2018-12-02 PROCEDURE — 85730 THROMBOPLASTIN TIME PARTIAL: CPT

## 2018-12-02 PROCEDURE — 93005 ELECTROCARDIOGRAM TRACING: CPT | Performed by: NURSE PRACTITIONER

## 2018-12-02 PROCEDURE — 99214 OFFICE O/P EST MOD 30 MIN: CPT | Performed by: NURSE PRACTITIONER

## 2018-12-02 PROCEDURE — 83735 ASSAY OF MAGNESIUM: CPT

## 2018-12-02 PROCEDURE — G0480 DRUG TEST DEF 1-7 CLASSES: HCPCS

## 2018-12-02 PROCEDURE — 6360000002 HC RX W HCPCS: Performed by: NURSE PRACTITIONER

## 2018-12-02 RX ORDER — KETOROLAC TROMETHAMINE 30 MG/ML
30 INJECTION, SOLUTION INTRAMUSCULAR; INTRAVENOUS ONCE
Status: COMPLETED | OUTPATIENT
Start: 2018-12-02 | End: 2018-12-02

## 2018-12-02 RX ORDER — ACETAMINOPHEN 500 MG
500 TABLET ORAL EVERY 6 HOURS PRN
COMMUNITY
End: 2020-10-09

## 2018-12-02 RX ADMIN — KETOROLAC TROMETHAMINE 30 MG: 30 INJECTION, SOLUTION INTRAMUSCULAR at 16:45

## 2018-12-02 ASSESSMENT — ENCOUNTER SYMPTOMS
COUGH: 0
WHEEZING: 0
DIARRHEA: 0
SINUS PAIN: 0
VOICE CHANGE: 0
ABDOMINAL DISTENTION: 0
CHEST TIGHTNESS: 0
RHINORRHEA: 0
CONSTIPATION: 0
SORE THROAT: 0
STRIDOR: 0
ABDOMINAL PAIN: 0
BACK PAIN: 0
NAUSEA: 0
TROUBLE SWALLOWING: 0
SINUS PRESSURE: 0
PHOTOPHOBIA: 0
SHORTNESS OF BREATH: 1
SHORTNESS OF BREATH: 0
VOMITING: 0

## 2018-12-02 ASSESSMENT — PAIN DESCRIPTION - DESCRIPTORS: DESCRIPTORS: ACHING

## 2018-12-02 ASSESSMENT — PAIN DESCRIPTION - LOCATION: LOCATION: SHOULDER;CHEST

## 2018-12-02 ASSESSMENT — PAIN DESCRIPTION - FREQUENCY: FREQUENCY: CONTINUOUS

## 2018-12-02 ASSESSMENT — PAIN DESCRIPTION - PROGRESSION: CLINICAL_PROGRESSION: GRADUALLY WORSENING

## 2018-12-02 ASSESSMENT — PAIN DESCRIPTION - ORIENTATION: ORIENTATION: RIGHT

## 2018-12-02 ASSESSMENT — PAIN SCALES - GENERAL: PAINLEVEL_OUTOF10: 6

## 2018-12-02 ASSESSMENT — PAIN DESCRIPTION - PAIN TYPE: TYPE: ACUTE PAIN

## 2018-12-02 ASSESSMENT — PAIN DESCRIPTION - ONSET: ONSET: GRADUAL

## 2018-12-02 NOTE — ED PROVIDER NOTES
Eyes: Pupils are equal, round, and reactive to light. Conjunctivae and EOM are normal. Right eye exhibits no discharge. Left eye exhibits no discharge. Neck: Normal range of motion. Neck supple. No JVD present. No tracheal deviation present. Cardiovascular: Regular rhythm and normal heart sounds. No murmur heard. Bradycardic   Pulmonary/Chest: Effort normal and breath sounds normal. No stridor. No respiratory distress. She has no wheezes. She has no rales. Abdominal: Soft. She exhibits no distension and no mass. There is no tenderness. There is no guarding. Musculoskeletal: She exhibits tenderness. She exhibits no edema or deformity. Right-sided shoulder and chest wall pain. Worse with palpation. Range of motion of the right shoulder normal and intact. Mild tenderness appreciated over the right paracervical/trapezius muscles. Negative Thomas, empty can and Neers. Neurological: She is alert and oriented to person, place, and time. No cranial nerve deficit or sensory deficit. She exhibits normal muscle tone. Coordination normal.   Normal gait   Skin: Skin is warm and dry. Capillary refill takes less than 2 seconds. No rash noted. No erythema. No pallor. Psychiatric: She has a normal mood and affect.  Judgment normal.       GLASCOW COMA SCALE  NEUROMUSCULAR BLOCKADE PRIOR TO ARRIVAL [x]No []Yes    Variable  Score   Variable  Score  Eye opening [x]Spontaneous 4 Verbal  [x]Oriented  5     []To voice  3   []Confused  4    []To pain  2   []Inapp words  3    []None  1   []Incomp words 2       []None  1   Motor   [x]Obeys  6    []Localizes pain 5    []Withdraws(pain) 4    []Flexion(pain) 3  []Extension(pain) 2    []None  1     GCS Total = 15        DIFFERENTIAL DIAGNOSIS:   Diagnoses discussed with the patient and includedbut not limited to Muscle strain, muscle spasm, ACS, MI, cervical radiculopathy    DIAGNOSTIC RESULTS     EKG: AllEKG's are interpreted by the Emergency Department Physician who

## 2018-12-03 LAB
EKG ATRIAL RATE: 53 BPM
EKG P AXIS: 50 DEGREES
EKG P-R INTERVAL: 128 MS
EKG Q-T INTERVAL: 440 MS
EKG QRS DURATION: 90 MS
EKG QTC CALCULATION (BAZETT): 412 MS
EKG R AXIS: 45 DEGREES
EKG T AXIS: 25 DEGREES
EKG VENTRICULAR RATE: 53 BPM

## 2018-12-03 PROCEDURE — 93010 ELECTROCARDIOGRAM REPORT: CPT | Performed by: INTERNAL MEDICINE

## 2018-12-04 ENCOUNTER — HOSPITAL ENCOUNTER (EMERGENCY)
Age: 38
Discharge: HOME | End: 2018-12-04
Attending: FAMILY MEDICINE
Payer: COMMERCIAL

## 2018-12-04 ENCOUNTER — APPOINTMENT (OUTPATIENT)
Dept: GENERAL RADIOLOGY | Age: 38
End: 2018-12-04
Payer: COMMERCIAL

## 2018-12-04 ENCOUNTER — OFFICE VISIT (OUTPATIENT)
Dept: INTERNAL MEDICINE CLINIC | Age: 38
End: 2018-12-04
Payer: COMMERCIAL

## 2018-12-04 VITALS
BODY MASS INDEX: 35.01 KG/M2 | DIASTOLIC BLOOD PRESSURE: 100 MMHG | TEMPERATURE: 98.8 F | OXYGEN SATURATION: 100 % | RESPIRATION RATE: 14 BRPM | HEIGHT: 68 IN | HEART RATE: 63 BPM | SYSTOLIC BLOOD PRESSURE: 156 MMHG | WEIGHT: 231 LBS

## 2018-12-04 VITALS
SYSTOLIC BLOOD PRESSURE: 130 MMHG | HEART RATE: 70 BPM | DIASTOLIC BLOOD PRESSURE: 82 MMHG | HEIGHT: 68 IN | WEIGHT: 236 LBS | BODY MASS INDEX: 35.77 KG/M2 | TEMPERATURE: 97 F

## 2018-12-04 DIAGNOSIS — F41.0 PANIC ATTACK DUE TO EXCEPTIONAL STRESS: Primary | ICD-10-CM

## 2018-12-04 DIAGNOSIS — F41.9 ANXIETY DISORDER, UNSPECIFIED TYPE: ICD-10-CM

## 2018-12-04 DIAGNOSIS — I10 UNCONTROLLED HYPERTENSION: Primary | ICD-10-CM

## 2018-12-04 DIAGNOSIS — R03.0 BLOOD PRESSURE ELEVATED WITHOUT HISTORY OF HTN: ICD-10-CM

## 2018-12-04 DIAGNOSIS — F43.0 PANIC ATTACK DUE TO EXCEPTIONAL STRESS: Primary | ICD-10-CM

## 2018-12-04 DIAGNOSIS — M25.511 ACUTE PAIN OF RIGHT SHOULDER: ICD-10-CM

## 2018-12-04 LAB
ALBUMIN SERPL-MCNC: 4.3 G/DL (ref 3.5–5.1)
ALP BLD-CCNC: 67 U/L (ref 38–126)
ALT SERPL-CCNC: 13 U/L (ref 11–66)
AMPHETAMINE+METHAMPHETAMINE URINE SCREEN: NEGATIVE
ANION GAP SERPL CALCULATED.3IONS-SCNC: 13 MEQ/L (ref 8–16)
APTT: 34.5 SECONDS (ref 22–38)
AST SERPL-CCNC: 17 U/L (ref 5–40)
BARBITURATE QUANTITATIVE URINE: NEGATIVE
BASOPHILS # BLD: 0.7 %
BASOPHILS ABSOLUTE: 0.1 THOU/MM3 (ref 0–0.1)
BENZODIAZEPINE QUANTITATIVE URINE: NEGATIVE
BILIRUB SERPL-MCNC: 0.3 MG/DL (ref 0.3–1.2)
BILIRUBIN URINE: NEGATIVE
BLOOD, URINE: NEGATIVE
BUN BLDV-MCNC: 12 MG/DL (ref 7–22)
CALCIUM SERPL-MCNC: 9.3 MG/DL (ref 8.5–10.5)
CANNABINOID QUANTITATIVE URINE: POSITIVE
CHARACTER, URINE: CLEAR
CHLORIDE BLD-SCNC: 105 MEQ/L (ref 98–111)
CO2: 24 MEQ/L (ref 23–33)
COCAINE METABOLITE QUANTITATIVE URINE: NEGATIVE
COLOR: YELLOW
CREAT SERPL-MCNC: 0.9 MG/DL (ref 0.4–1.2)
EOSINOPHIL # BLD: 0.4 %
EOSINOPHILS ABSOLUTE: 0 THOU/MM3 (ref 0–0.4)
ERYTHROCYTE [DISTWIDTH] IN BLOOD BY AUTOMATED COUNT: 13.2 % (ref 11.5–14.5)
ERYTHROCYTE [DISTWIDTH] IN BLOOD BY AUTOMATED COUNT: 42.7 FL (ref 35–45)
ETHYL ALCOHOL, SERUM: < 0.01 %
GFR SERPL CREATININE-BSD FRML MDRD: 84 ML/MIN/1.73M2
GLUCOSE BLD-MCNC: 97 MG/DL (ref 70–108)
GLUCOSE URINE: NEGATIVE MG/DL
HCT VFR BLD CALC: 37.6 % (ref 37–47)
HEMOGLOBIN: 12.8 GM/DL (ref 12–16)
IMMATURE GRANS (ABS): 0.02 THOU/MM3 (ref 0–0.07)
IMMATURE GRANULOCYTES: 0.3 %
INR BLD: 1.23 (ref 0.85–1.13)
KETONES, URINE: NEGATIVE
LEUKOCYTE ESTERASE, URINE: NEGATIVE
LYMPHOCYTES # BLD: 32 %
LYMPHOCYTES ABSOLUTE: 2.4 THOU/MM3 (ref 1–4.8)
MCH RBC QN AUTO: 30.5 PG (ref 26–33)
MCHC RBC AUTO-ENTMCNC: 34 GM/DL (ref 32.2–35.5)
MCV RBC AUTO: 89.5 FL (ref 81–99)
MONOCYTES # BLD: 7.6 %
MONOCYTES ABSOLUTE: 0.6 THOU/MM3 (ref 0.4–1.3)
NITRITE, URINE: NEGATIVE
NUCLEATED RED BLOOD CELLS: 0 /100 WBC
OPIATES, URINE: NEGATIVE
OSMOLALITY CALCULATION: 282.8 MOSMOL/KG (ref 275–300)
OXYCODONE: NEGATIVE
PH UA: 7.5
PHENCYCLIDINE QUANTITATIVE URINE: NEGATIVE
PLATELET # BLD: 314 THOU/MM3 (ref 130–400)
PMV BLD AUTO: 10.7 FL (ref 9.4–12.4)
POTASSIUM SERPL-SCNC: 3.6 MEQ/L (ref 3.5–5.2)
PROTEIN UA: NEGATIVE
RBC # BLD: 4.2 MILL/MM3 (ref 4.2–5.4)
SEG NEUTROPHILS: 59 %
SEGMENTED NEUTROPHILS ABSOLUTE COUNT: 4.4 THOU/MM3 (ref 1.8–7.7)
SODIUM BLD-SCNC: 142 MEQ/L (ref 135–145)
SPECIFIC GRAVITY, URINE: 1.01 (ref 1–1.03)
TOTAL PROTEIN: 7 G/DL (ref 6.1–8)
TROPONIN T: < 0.01 NG/ML
TSH SERPL DL<=0.05 MIU/L-ACNC: 2.14 UIU/ML (ref 0.4–4.2)
UROBILINOGEN, URINE: 1 EU/DL
WBC # BLD: 7.5 THOU/MM3 (ref 4.8–10.8)

## 2018-12-04 PROCEDURE — 84484 ASSAY OF TROPONIN QUANT: CPT

## 2018-12-04 PROCEDURE — 99205 OFFICE O/P NEW HI 60 MIN: CPT | Performed by: NURSE PRACTITIONER

## 2018-12-04 PROCEDURE — 81003 URINALYSIS AUTO W/O SCOPE: CPT

## 2018-12-04 PROCEDURE — 71046 X-RAY EXAM CHEST 2 VIEWS: CPT

## 2018-12-04 PROCEDURE — 80307 DRUG TEST PRSMV CHEM ANLYZR: CPT

## 2018-12-04 PROCEDURE — 93784 AMBL BP MNTR W/SOFTWARE: CPT | Performed by: NURSE PRACTITIONER

## 2018-12-04 PROCEDURE — 36415 COLL VENOUS BLD VENIPUNCTURE: CPT

## 2018-12-04 PROCEDURE — 6370000000 HC RX 637 (ALT 250 FOR IP): Performed by: FAMILY MEDICINE

## 2018-12-04 PROCEDURE — 80053 COMPREHEN METABOLIC PANEL: CPT

## 2018-12-04 PROCEDURE — G0480 DRUG TEST DEF 1-7 CLASSES: HCPCS

## 2018-12-04 PROCEDURE — 85730 THROMBOPLASTIN TIME PARTIAL: CPT

## 2018-12-04 PROCEDURE — 99283 EMERGENCY DEPT VISIT LOW MDM: CPT

## 2018-12-04 PROCEDURE — 85025 COMPLETE CBC W/AUTO DIFF WBC: CPT

## 2018-12-04 PROCEDURE — 84443 ASSAY THYROID STIM HORMONE: CPT

## 2018-12-04 PROCEDURE — 85610 PROTHROMBIN TIME: CPT

## 2018-12-04 RX ORDER — HYDROXYZINE HYDROCHLORIDE 10 MG/1
10 TABLET, FILM COATED ORAL EVERY 8 HOURS PRN
Qty: 20 TABLET | Refills: 0 | Status: SHIPPED | OUTPATIENT
Start: 2018-12-04 | End: 2019-04-29 | Stop reason: SDUPTHER

## 2018-12-04 RX ORDER — LISINOPRIL 5 MG/1
5 TABLET ORAL DAILY
Qty: 30 TABLET | Refills: 0 | Status: SHIPPED | OUTPATIENT
Start: 2018-12-04 | End: 2018-12-19 | Stop reason: SDUPTHER

## 2018-12-04 RX ORDER — METHYLPREDNISOLONE 4 MG/1
TABLET ORAL
Qty: 1 KIT | Refills: 0 | Status: CANCELLED | OUTPATIENT
Start: 2018-12-04

## 2018-12-04 RX ORDER — VENLAFAXINE HYDROCHLORIDE 37.5 MG/1
37.5 CAPSULE, EXTENDED RELEASE ORAL DAILY
Qty: 7 CAPSULE | Refills: 0 | Status: SHIPPED | OUTPATIENT
Start: 2018-12-04 | End: 2018-12-19 | Stop reason: ALTCHOICE

## 2018-12-04 RX ORDER — LISINOPRIL 5 MG/1
5 TABLET ORAL DAILY
Status: DISCONTINUED | OUTPATIENT
Start: 2018-12-04 | End: 2018-12-04 | Stop reason: HOSPADM

## 2018-12-04 RX ORDER — VENLAFAXINE HYDROCHLORIDE 75 MG/1
75 CAPSULE, EXTENDED RELEASE ORAL DAILY
Qty: 30 CAPSULE | Refills: 2 | Status: SHIPPED | OUTPATIENT
Start: 2018-12-11 | End: 2018-12-19 | Stop reason: DRUGHIGH

## 2018-12-04 RX ADMIN — LISINOPRIL 5 MG: 5 TABLET ORAL at 13:01

## 2018-12-04 ASSESSMENT — PATIENT HEALTH QUESTIONNAIRE - PHQ9
SUM OF ALL RESPONSES TO PHQ QUESTIONS 1-9: 0
1. LITTLE INTEREST OR PLEASURE IN DOING THINGS: 0
SUM OF ALL RESPONSES TO PHQ QUESTIONS 1-9: 0
2. FEELING DOWN, DEPRESSED OR HOPELESS: 0
SUM OF ALL RESPONSES TO PHQ9 QUESTIONS 1 & 2: 0

## 2018-12-04 NOTE — ED PROVIDER NOTES
discharge. Neck: Normal range of motion. Neck supple. No JVD present. No carotid bruit   Cardiovascular: Normal rate, regular rhythm and normal heart sounds. Exam reveals no friction rub. No murmur heard. Pulmonary/Chest: Effort normal and breath sounds normal. No stridor. No respiratory distress. She has no wheezes. She has no rales. She exhibits no tenderness. Abdominal: Soft. She exhibits no distension and no mass. There is no tenderness. There is no guarding. Musculoskeletal: Normal range of motion. She exhibits no edema, tenderness or deformity. Neurological: She is alert and oriented to person, place, and time. She displays normal reflexes. No cranial nerve deficit. Coordination normal.   Skin: Skin is warm and dry. Capillary refill takes less than 2 seconds. No rash noted. She is not diaphoretic. No erythema. No pallor. Psychiatric: She has a normal mood and affect. Judgment normal.       GLASCOW COMA SCALE  NEUROMUSCULAR BLOCKADE PRIOR TO ARRIVAL [x]No []Yes    Variable  Score   Variable  Score  Eye opening [x]Spontaneous 4 Verbal  [x]Oriented  5     []To voice  3   []Confused  4    []To pain  2   []Inapp words  3    []None  1   []Incomp words 2       []None  1   Motor   [x]Obeys  6    []Localizes pain 5    []Withdraws(pain) 4    []Flexion(pain) 3  []Extension(pain) 2    []None  1     GCS Total = 15      DIFFERENTIAL DIAGNOSIS:   Diagnoses discussed with the patient and includedbut not limited to anxiety, ACS, PE (low probability), PNA, dysrhythmia    DIAGNOSTIC RESULTS     EKG: AllEKG's are interpreted by the Emergency Department Physician who either signs or Co-signs this chart in the absence of a cardiologist.      RADIOLOGY: non-plain film images(s) such as CT, Ultrasound and MRI are read by the radiologist.    XR CHEST STANDARD (2 VW)   Final Result      Stable radiographic appearance of the chest. No evidence of an acute intrathoracic process.                **This report has been

## 2018-12-04 NOTE — PROGRESS NOTES
1980    Chief Complaint   Patient presents with    Other     f/u [facility] - elevated BP , seen for right shoulder pain and right neck pain        Current concerns - 43y.o. year old female presented to [facility] on 12/2/18 with right chest pain that radiated into right shoulder and right lateral ribs that started last Wednesday. She started a new job at work that required a lot of overhead lifting last Tuesday and states she remembers having a pull sensation upon picking up an axle and lifting into the racks. Her BP was very high. EKG was sinus rhythm, bradycardic. /88, down to 155/98. Sent to ED, determined not cardiac origin and felt muscle strain. She was given one dose Toradol 30 mg IM, which did improve her symptoms. She has been taking Tylenol PM to sleep at night. States Ibuprofen doesn't help her shoulder/chest pain. Took a day off work and she states her pain much improved. States at work today 12/4/18, started getting SOB after 2.5 hours. Her head and face became numb, seeing white lights/spots, couldn't catch her breath. She went to the first aid/emergency office, /112. They called squad, and when they came all symptoms resolved. BP was 190 initially, then 160, reduced and she could breathe ok. Presented back in the ED, negative CXR, /89. Labs repeated, unremarkable workup. She was given one dose of Lisinopril 5 mg and she opted to come here for follow up today. She does report she has a runny nose, cough and sneezing. She states her children are sick. She is not sleeping well. Taking Tylenol PM. States she is under a great deal of stress. Taj Duque diagnosed with severe CHF early this year in ICU and sounds like he may be terminal, she states he needs transplant. Working 60-70 hours per week, new job last Tuesday with a lot of stress and issues with unhappy coworkers.  She has three children she is trying to get arranged for care and get to activities etc, while trying to attend to weakness  Neurological ROS: negative for - bowel and bladder control changes, confusion, dizziness, gait disturbance, headaches, impaired coordination/balance or numbness/tingling  Dermatological ROS: negative for - acne or rash    Blood pressure 130/82, pulse 70, temperature 97 °F (36.1 °C), height 5' 8\" (1.727 m), weight 236 lb (107 kg), last menstrual period 11/25/2018, not currently breastfeeding. Physical Examination:   Constitutional - Alert, cooperative, well groomed, and in no distress. Vital signs stable   Vitals:    12/04/18 1346   BP: 130/82   Pulse: 70   Temp: 97 °F (36.1 °C)     Mental status - Alert and oriented to person, place, and time. Good insight, appropriate responses, mood appropriate. Head - Atraumatic, normocephalic. Eyes - Pupils equal and reactive to light, extraocular movements intact  Ears - External ears are normal, hearing is grossly normal to whispered voice and finger rub, otoscopic exam TMs pearly gray no effusion  Mouth - Oropharynx clear, mucous membranes pink and moist, dentition intact. Nose - Reddened boggy nasal turbinates, clear drainage. Neck - supple, no significant adenopathy, no JVD. Chest - No distress. No increased work of breathing. Clear to auscultation in all fields, no wheezes, rales or rhonchi, symmetric air entry. Very large pendulous breasts, some muscular tenderness to right anterior upper chest which resolves when breast is lifted and supported. Heart - normal rate, regular rhythm, normal S1, S2, no murmurs, rubs  or gallops  Abdomen -soft, nontender, nondistended  Neurological - alert, oriented, cranial nerves II-XII intact without noted deficits, motor and sensation are grossly intact bilateral upper and lower extremities. Extremities - peripheral pulses normal, no pedal edema, no clubbing or cyanosis. Left arm with full ROM, negative neers/olivier/full-empty can tests.  Right arm with pain to apex of shoulder and anterior upper right chest

## 2018-12-04 NOTE — ED TRIAGE NOTES
Pt presents to room 6 per [facility]. Pt was seen on Sunday for work injury and was discharged. Pt states that her blood pressure was high during her visit. Pt states that she felt short of breath this morning upon arrival to work, but reports that she has cold-like symptoms, sneezing and runny nose. Per statement of pt, pt went back to work today and felt that her face and head was tingling, increased shortness of breath, and was \"seeing spots of light. \"   Pt states that she went to the nurse's office and her blood pressure was high. Pt states that she was hyperventilating and felt anxious. Pt states that symptoms have pretty much resolved now. At present time, pt is alert and oriented. Respirations even and unlabored; skin warm & dry. NAD noted.

## 2018-12-07 ASSESSMENT — ENCOUNTER SYMPTOMS
CHEST TIGHTNESS: 0
ABDOMINAL DISTENTION: 0
ABDOMINAL PAIN: 0
NAUSEA: 0
COUGH: 0
SORE THROAT: 0
WHEEZING: 0
PHOTOPHOBIA: 0
DIARRHEA: 0
RHINORRHEA: 0
SHORTNESS OF BREATH: 0
STRIDOR: 0
COLOR CHANGE: 0
CONSTIPATION: 0

## 2018-12-11 ENCOUNTER — HOSPITAL ENCOUNTER (OUTPATIENT)
Age: 38
Discharge: HOME | End: 2018-12-11
Payer: COMMERCIAL

## 2018-12-11 ENCOUNTER — OFFICE VISIT (OUTPATIENT)
Dept: INTERNAL MEDICINE CLINIC | Age: 38
End: 2018-12-11
Payer: COMMERCIAL

## 2018-12-11 VITALS
BODY MASS INDEX: 34.89 KG/M2 | HEART RATE: 76 BPM | HEIGHT: 68 IN | WEIGHT: 230.2 LBS | SYSTOLIC BLOOD PRESSURE: 118 MMHG | DIASTOLIC BLOOD PRESSURE: 80 MMHG

## 2018-12-11 DIAGNOSIS — I10 ESSENTIAL HYPERTENSION: ICD-10-CM

## 2018-12-11 DIAGNOSIS — R25.3 MUSCLE TWITCH: ICD-10-CM

## 2018-12-11 DIAGNOSIS — F41.8 DEPRESSION WITH ANXIETY: Primary | ICD-10-CM

## 2018-12-11 LAB
ANION GAP SERPL CALCULATED.3IONS-SCNC: 11 MEQ/L (ref 8–16)
BUN BLDV-MCNC: 12 MG/DL (ref 7–22)
CALCIUM SERPL-MCNC: 9.6 MG/DL (ref 8.5–10.5)
CHLORIDE BLD-SCNC: 103 MEQ/L (ref 98–111)
CO2: 26 MEQ/L (ref 23–33)
GLUCOSE BLD-MCNC: 90 MG/DL (ref 70–108)
MAGNESIUM: 1.9 MG/DL (ref 1.6–2.4)
POTASSIUM SERPL-SCNC: 3.7 MEQ/L (ref 3.5–5.2)
SODIUM BLD-SCNC: 140 MEQ/L (ref 135–145)

## 2018-12-11 PROCEDURE — 36415 COLL VENOUS BLD VENIPUNCTURE: CPT

## 2018-12-11 PROCEDURE — 82947 ASSAY GLUCOSE BLOOD QUANT: CPT

## 2018-12-11 PROCEDURE — 80051 ELECTROLYTE PANEL: CPT

## 2018-12-11 PROCEDURE — 99214 OFFICE O/P EST MOD 30 MIN: CPT | Performed by: NURSE PRACTITIONER

## 2018-12-11 PROCEDURE — 83735 ASSAY OF MAGNESIUM: CPT

## 2018-12-11 PROCEDURE — 82310 ASSAY OF CALCIUM: CPT

## 2018-12-11 PROCEDURE — 84520 ASSAY OF UREA NITROGEN: CPT

## 2018-12-11 NOTE — PROGRESS NOTES
1980    Chief Complaint   Patient presents with    Anxiety     1 weeks     Hypertension       This patient presents for medical evaluation. I last saw the patient 1 week ago. Current concerns - 43y.o. year old female presents for follow up, she started the Effexor on Sunday. She states her anxiety symptoms have much improved. Appetite is improving. No panic or anxiety at this time. Tawnya Luevano is in [facility] now and is ventilated, awaiting a heart transplant. There was some problems with the family of the jb and she has been unable to visit as they do not allow her to come per her report. She states she was not initially receptive to needing med for depression/anxiety/panic and after some introspection this past week, time off work and further research and thought about her symptoms she decided to start the Effexor on Sunday. She has been taking the Lisinopril. Has been having right lower lip twitching for the past month and had some right buttock twitching a week or two ago. She is concerned about possibility of MS, bells palsy, etc that she found on the internet.        Patient Active Problem List   Diagnosis    Smoker    Obesity    Depression with anxiety    Muscle twitch    Essential hypertension       Current Outpatient Prescriptions   Medication Sig Dispense Refill    lisinopril (PRINIVIL;ZESTRIL) 5 MG tablet Take 1 tablet by mouth daily 30 tablet 0    venlafaxine (EFFEXOR XR) 37.5 MG extended release capsule Take 1 capsule by mouth daily For one week, then increase to 75 mg 7 capsule 0    hydrOXYzine (ATARAX) 10 MG tablet Take 1 tablet by mouth every 8 hours as needed for Anxiety 20 tablet 0    acetaminophen (TYLENOL) 500 MG tablet Take 500 mg by mouth every 6 hours as needed for Pain      ibuprofen (ADVIL;MOTRIN) 600 MG tablet Take 1 tablet by mouth every 6 hours as needed for Pain 30 tablet 0    venlafaxine (EFFEXOR XR) 75 MG extended release capsule Take 1 capsule noted deficits, motor and sensation are grossly intact bilateral upper and lower extremities. Strength 5/5 in all extremities. Extremities - peripheral pulses normal, no pedal edema, no clubbing or cyanosis  Skin - warm and dry, no rashes, lesions, or wounds evident    Diagnostic Data:  I have reviewed recent diagnostic testing including labs, reviewed 24 hour BP monitor with pt and discussed results. Lab Results   Component Value Date     12/11/2018    K 3.7 12/11/2018     12/11/2018    CO2 26 12/11/2018    BUN 12 12/11/2018    CREATININE 0.9 12/04/2018    GLUCOSE 90 12/11/2018    CALCIUM 9.6 12/11/2018      No results found for: LABA1C  No results found for: EAG  No results found for: CHOL  No results found for: TRIG  No results found for: HDL  No results found for: LDLCHOLESTEROL, LDLCALC  No results found for: LABVLDL, VLDL  No results found for: [facility]  Lab Results   Component Value Date    WBC 7.5 12/04/2018    HGB 12.8 12/04/2018    HCT 37.6 12/04/2018    MCV 89.5 12/04/2018     12/04/2018         Assessment/Plan:   Diagnosis Orders   1. Depression with anxiety     2. Muscle twitch  BMP Without Creatinine    Magnesium   3. Essential hypertension         Orders Placed This Encounter   Procedures    BMP Without Creatinine     Standing Status:   Future     Number of Occurrences:   1     Standing Expiration Date:   12/11/2019    Magnesium     Standing Status:   Future     Number of Occurrences:   1     Standing Expiration Date:   12/11/2019      1) Depression/anxiety/panic, improving with reduction of stressors and initiation of Effexor, continue and increase to 75 mg.   - Also, follow up with counseling to help develop coping skills, pt now agreeable, discussed with SW and will help to arrange referral with IOP/BHI. - She wants to go back to work on Thursday this week.  Cleared to return to work Thursday.   - Call if any increased panic symptoms, anxiety/depression. - Rely on your identified support system. - Approximately 30 minutes spent in emotional counseling with patient, and discussion of stressor triggers ioncluding avoidance if possible. Take a few moments of time to calm, use deep cleansing breaths to calm yourself. 2) Muscle twitching, electrolytes stable, no need for further workup, likely stress related. Call if worsening. 3) HTN, 24 hours BP monitor shows stage 1 HTN, possibly stage 2, although she feels it is trumped up due to stress. - Continue Lisinopril 5 mg. Will schedule follow up appointment in 1 week. Discussed with Dr. Geoff Bruno. Electronically signed by TO Dowell CNP 12/11/18 11:27 AM     [address]  [facility], [facility]     Phone number: [phone]  Fax number: [phone]

## 2018-12-13 ENCOUNTER — TELEPHONE (OUTPATIENT)
Dept: INTERNAL MEDICINE CLINIC | Age: 38
End: 2018-12-13

## 2018-12-19 ENCOUNTER — OFFICE VISIT (OUTPATIENT)
Dept: INTERNAL MEDICINE CLINIC | Age: 38
End: 2018-12-19
Payer: COMMERCIAL

## 2018-12-19 VITALS
DIASTOLIC BLOOD PRESSURE: 86 MMHG | HEIGHT: 68 IN | HEART RATE: 68 BPM | BODY MASS INDEX: 34.86 KG/M2 | WEIGHT: 230 LBS | SYSTOLIC BLOOD PRESSURE: 124 MMHG

## 2018-12-19 DIAGNOSIS — I10 ESSENTIAL HYPERTENSION: ICD-10-CM

## 2018-12-19 DIAGNOSIS — F43.0 PANIC ATTACK DUE TO EXCEPTIONAL STRESS: ICD-10-CM

## 2018-12-19 DIAGNOSIS — F41.0 PANIC ATTACK DUE TO EXCEPTIONAL STRESS: ICD-10-CM

## 2018-12-19 DIAGNOSIS — R07.89 CHEST WALL PAIN: Primary | ICD-10-CM

## 2018-12-19 DIAGNOSIS — F41.9 ANXIETY DISORDER, UNSPECIFIED TYPE: ICD-10-CM

## 2018-12-19 PROCEDURE — 93000 ELECTROCARDIOGRAM COMPLETE: CPT | Performed by: NURSE PRACTITIONER

## 2018-12-19 PROCEDURE — 99213 OFFICE O/P EST LOW 20 MIN: CPT | Performed by: NURSE PRACTITIONER

## 2018-12-19 RX ORDER — VENLAFAXINE HYDROCHLORIDE 75 MG/1
150 CAPSULE, EXTENDED RELEASE ORAL DAILY
Qty: 30 CAPSULE | Refills: 2 | Status: SHIPPED | OUTPATIENT
Start: 2018-12-19 | End: 2018-12-19 | Stop reason: ALTCHOICE

## 2018-12-19 RX ORDER — METHYLPREDNISOLONE 4 MG/1
TABLET ORAL
Qty: 1 KIT | Refills: 0 | Status: SHIPPED | OUTPATIENT
Start: 2018-12-19 | End: 2018-12-25

## 2018-12-19 RX ORDER — LISINOPRIL 5 MG/1
5 TABLET ORAL DAILY
Qty: 30 TABLET | Refills: 5 | Status: SHIPPED | OUTPATIENT
Start: 2018-12-19 | End: 2018-12-26 | Stop reason: DRUGHIGH

## 2018-12-19 NOTE — PROGRESS NOTES
1980    Chief Complaint   Patient presents with    Depression    Anxiety    Other     pain that comes and goes mostly left upper chest, radiates across left breast and around side to mid back; also has had on right side       This patient presents for medical evaluation. I last saw the patient 1 week ago. Current concerns - 43y.o. year old female presented to clinic with left chest wall pain. States this was worse at work. This is relieved by elevation of the breast and taking her BP meds. This began on just after last appt, lasting intermittently. Characterizes these symptoms as aching. She denies SOB, fatigue, malaise. No left arm/jaw radiation. She does have left arm pain sometimes with some heaviness. She attributes this to donating plasma in the past. Not taking the Effexor. Her panic has resolved, and she is able to manage her anxiety symptoms with deep breathing and removing herself from the situation. Her fiancee is doing better. /86. Patient Active Problem List   Diagnosis    Smoker    Obesity    Depression with anxiety    Muscle twitch    Essential hypertension       Current Outpatient Prescriptions   Medication Sig Dispense Refill    acetaminophen (TYLENOL) 500 MG tablet Take 500 mg by mouth every 6 hours as needed for Pain      lisinopril (PRINIVIL;ZESTRIL) 10 MG tablet Take 1 tablet by mouth daily 30 tablet 5    pantoprazole (PROTONIX) 20 MG tablet Take 1 tablet by mouth daily 30 tablet 3    fluticasone (FLONASE) 50 MCG/ACT nasal spray 2 sprays by Each Nare route daily 1 Bottle 2     No current facility-administered medications for this visit. No Known Allergies    Review of Systems - General ROS: negative for - chills, fatigue, fever or malaise  Psychological ROS: negative for - anxiety or depression  Hematological and Lymphatic ROS: No history of blood clots or bleeding disorder.    Respiratory ROS: no cough, shortness of breath, or

## 2018-12-25 ENCOUNTER — NURSE TRIAGE (OUTPATIENT)
Dept: ADMINISTRATIVE | Age: 38
End: 2018-12-25

## 2018-12-25 NOTE — TELEPHONE ENCOUNTER
Reason for Disposition   Caller has medication question, adult has minor symptoms, caller declines triage, and triager answers question    Answer Assessment - Initial Assessment Questions  1. SYMPTOMS: \"Do you have any symptoms? \"      abcess  2. SEVERITY: If symptoms are present, ask \"Are they mild, moderate or severe? \"      moderate    Protocols used: [facility]ST[facility]

## 2018-12-26 ENCOUNTER — OFFICE VISIT (OUTPATIENT)
Dept: INTERNAL MEDICINE CLINIC | Age: 38
End: 2018-12-26
Payer: COMMERCIAL

## 2018-12-26 VITALS
HEIGHT: 68 IN | SYSTOLIC BLOOD PRESSURE: 140 MMHG | RESPIRATION RATE: 18 BRPM | HEART RATE: 85 BPM | BODY MASS INDEX: 34.22 KG/M2 | DIASTOLIC BLOOD PRESSURE: 90 MMHG | WEIGHT: 225.8 LBS

## 2018-12-26 DIAGNOSIS — I10 ESSENTIAL HYPERTENSION: ICD-10-CM

## 2018-12-26 DIAGNOSIS — F41.9 ANXIETY: ICD-10-CM

## 2018-12-26 DIAGNOSIS — K21.9 MILD ACID REFLUX: ICD-10-CM

## 2018-12-26 DIAGNOSIS — R07.9 CHEST PAIN IN ADULT: ICD-10-CM

## 2018-12-26 DIAGNOSIS — M79.602 LEFT ARM PAIN: Primary | ICD-10-CM

## 2018-12-26 DIAGNOSIS — R09.81 NASAL CONGESTION: ICD-10-CM

## 2018-12-26 PROCEDURE — 99214 OFFICE O/P EST MOD 30 MIN: CPT | Performed by: NURSE PRACTITIONER

## 2018-12-26 RX ORDER — FLUTICASONE PROPIONATE 50 MCG
2 SPRAY, SUSPENSION (ML) NASAL DAILY
Qty: 1 BOTTLE | Refills: 2 | Status: SHIPPED | OUTPATIENT
Start: 2018-12-26 | End: 2018-12-27 | Stop reason: SDUPTHER

## 2018-12-26 RX ORDER — PANTOPRAZOLE SODIUM 20 MG/1
20 TABLET, DELAYED RELEASE ORAL DAILY
Qty: 30 TABLET | Refills: 3 | Status: SHIPPED | OUTPATIENT
Start: 2018-12-26 | End: 2018-12-27 | Stop reason: SDUPTHER

## 2018-12-26 RX ORDER — LISINOPRIL 10 MG/1
10 TABLET ORAL DAILY
Qty: 30 TABLET | Refills: 5 | Status: SHIPPED | OUTPATIENT
Start: 2018-12-26 | End: 2018-12-27 | Stop reason: SDUPTHER

## 2018-12-26 NOTE — PATIENT INSTRUCTIONS
Start the Effexor 37.5 mg then when complete increase to 75 mg. Use the Atarax when you need it, be aware of drowsiness. Stress test and EMG for left arm pain. Protonix 20 mg daily for acid reflux. Flonase 2 sprays each nostril twice daily.      Get any protein drink to take if you don't feel like eating

## 2018-12-27 ENCOUNTER — HOSPITAL ENCOUNTER (EMERGENCY)
Age: 38
Discharge: HOME | End: 2018-12-27
Payer: COMMERCIAL

## 2018-12-27 VITALS
RESPIRATION RATE: 16 BRPM | BODY MASS INDEX: 34.1 KG/M2 | HEART RATE: 80 BPM | OXYGEN SATURATION: 100 % | TEMPERATURE: 99.1 F | HEIGHT: 68 IN | SYSTOLIC BLOOD PRESSURE: 158 MMHG | DIASTOLIC BLOOD PRESSURE: 89 MMHG | WEIGHT: 225 LBS

## 2018-12-27 DIAGNOSIS — F43.0 STRESS REACTION: ICD-10-CM

## 2018-12-27 DIAGNOSIS — R09.81 NASAL CONGESTION: ICD-10-CM

## 2018-12-27 DIAGNOSIS — M79.622 LEFT UPPER ARM PAIN: ICD-10-CM

## 2018-12-27 DIAGNOSIS — I10 ESSENTIAL HYPERTENSION: ICD-10-CM

## 2018-12-27 DIAGNOSIS — K21.9 MILD ACID REFLUX: ICD-10-CM

## 2018-12-27 DIAGNOSIS — R07.89 ATYPICAL CHEST PAIN: ICD-10-CM

## 2018-12-27 DIAGNOSIS — I10 ESSENTIAL HYPERTENSION: Primary | ICD-10-CM

## 2018-12-27 LAB
EKG ATRIAL RATE: 82 BPM
EKG P AXIS: 56 DEGREES
EKG P-R INTERVAL: 128 MS
EKG Q-T INTERVAL: 374 MS
EKG QRS DURATION: 82 MS
EKG QTC CALCULATION (BAZETT): 436 MS
EKG R AXIS: 60 DEGREES
EKG T AXIS: 32 DEGREES
EKG VENTRICULAR RATE: 82 BPM

## 2018-12-27 PROCEDURE — 93010 ELECTROCARDIOGRAM REPORT: CPT | Performed by: INTERNAL MEDICINE

## 2018-12-27 PROCEDURE — 99215 OFFICE O/P EST HI 40 MIN: CPT

## 2018-12-27 PROCEDURE — 93005 ELECTROCARDIOGRAM TRACING: CPT | Performed by: NURSE PRACTITIONER

## 2018-12-27 PROCEDURE — 99214 OFFICE O/P EST MOD 30 MIN: CPT | Performed by: NURSE PRACTITIONER

## 2018-12-27 RX ORDER — FLUTICASONE PROPIONATE 50 MCG
2 SPRAY, SUSPENSION (ML) NASAL DAILY
Qty: 1 BOTTLE | Refills: 2 | OUTPATIENT
Start: 2018-12-27 | End: 2019-04-29 | Stop reason: ALTCHOICE

## 2018-12-27 RX ORDER — PANTOPRAZOLE SODIUM 20 MG/1
20 TABLET, DELAYED RELEASE ORAL DAILY
Qty: 30 TABLET | Refills: 3 | OUTPATIENT
Start: 2018-12-27 | End: 2019-01-17 | Stop reason: ALTCHOICE

## 2018-12-27 RX ORDER — LISINOPRIL 10 MG/1
10 TABLET ORAL DAILY
Qty: 30 TABLET | Refills: 5 | OUTPATIENT
Start: 2018-12-27 | End: 2019-07-08 | Stop reason: SDUPTHER

## 2018-12-27 ASSESSMENT — ENCOUNTER SYMPTOMS
ABDOMINAL PAIN: 0
SORE THROAT: 0
BACK PAIN: 0
SHORTNESS OF BREATH: 0
CHEST TIGHTNESS: 0
SINUS PAIN: 0
HEARTBURN: 0
NAUSEA: 0
DIARRHEA: 0
COUGH: 0
SINUS PRESSURE: 0
VOMITING: 0
RHINORRHEA: 0
TROUBLE SWALLOWING: 0

## 2018-12-27 ASSESSMENT — PAIN DESCRIPTION - LOCATION: LOCATION: CHEST

## 2018-12-27 ASSESSMENT — PAIN SCALES - GENERAL: PAINLEVEL_OUTOF10: 2

## 2018-12-27 NOTE — ED PROVIDER NOTES
In      lisinopril (PRINIVIL;ZESTRIL) 10 MG tablet Take 1 tablet by mouth daily, Disp-30 tablet, R-5Phone In      acetaminophen (TYLENOL) 500 MG tablet Take 500 mg by mouth every 6 hours as needed for PainHistorical Med       !! - Potential duplicate medications found. Please discuss with provider. ALLERGIES     Patient is has No Known Allergies. Patients   Immunization History   Administered Date(s) Administered    Td, unspecified formulation 07/05/2016       FAMILY HISTORY     Patient's family history includes Cancer in her paternal grandmother; Cancer (age of onset: 54) in her maternal grandfather; Diabetes (age of onset: 36) in her mother; Heart Attack in her paternal grandmother; Heart Attack (age of onset: 36) in her father; Heart Disease in her paternal grandmother; High Blood Pressure in her mother. SOCIAL HISTORY     Patient  reports that she has been smoking Cigarettes. She has a 3.00 pack-year smoking history. She has never used smokeless tobacco. She reports that she drinks alcohol. She reports that she does not use drugs. PHYSICAL EXAM     ED TRIAGE VITALS  BP: (!) 158/89, Temp: 99.1 °F (37.3 °C), Pulse: 80, Resp: 16, SpO2: 100 %,Estimated body mass index is 34.21 kg/m² as calculated from the following:    Height as of this encounter: 5' 8\" (1.727 m). Weight as of this encounter: 225 lb (102.1 kg). ,Patient's last menstrual period was 11/25/2018. Physical Exam   Constitutional: She is oriented to person, place, and time. Vital signs are normal. She appears well-developed and well-nourished. She is cooperative. Non-toxic appearance. She does not have a sickly appearance. She does not appear ill. No distress. HENT:   Head: Normocephalic. Right Ear: Hearing, tympanic membrane, external ear and ear canal normal. No drainage, swelling or tenderness. No mastoid tenderness.    Left Ear: Hearing, tympanic membrane, external ear and ear canal normal. No drainage, swelling or 12/27/18 1826   BP: (!) 158/89   Pulse: 80   Resp: 16   Temp: 99.1 °F (37.3 °C)   TempSrc: Temporal   SpO2: 100%   Weight: 225 lb (102.1 kg)   Height: 5' 8\" (1.727 m)       Medications - No data to display         PROCEDURES:  None    FINAL IMPRESSION      1. Essential hypertension    2. Atypical chest pain    3. Left upper arm pain    4. Stress reaction          DISPOSITION/ PLAN     The patient was advised to follow up with your PCP as scheduled. She was advised that if she would continue to have problems or concerns that she could go to the emergency room for evaluation. The patient was advised her EKG was normal today she stated that what she was here in the department talking that her pain was starting to resolve. The patient does not appear to be in any acute distress at present time. The patient is agreeable to the treatment plan this time and will follow up as scheduled. The patient left ambulatory without any problems. PATIENT REFERRED TO:  No primary care provider on file. No primary physician on file.       DISCHARGE MEDICATIONS:  Discharge Medication List as of 12/27/2018  7:03 PM          Discharge Medication List as of 12/27/2018  7:03 PM          Discharge Medication List as of 12/27/2018  7:03 PM          TO Flores CNP    (Please note that portions of this note were completed with a voice recognition program. Efforts were made to edit the dictations but occasionally words are mis-transcribed.)            TO Flores CNP  12/27/18 1912

## 2019-01-03 ENCOUNTER — OFFICE VISIT (OUTPATIENT)
Dept: INTERNAL MEDICINE CLINIC | Age: 39
End: 2019-01-03
Payer: COMMERCIAL

## 2019-01-03 VITALS
SYSTOLIC BLOOD PRESSURE: 158 MMHG | DIASTOLIC BLOOD PRESSURE: 84 MMHG | TEMPERATURE: 98.9 F | HEART RATE: 80 BPM | HEIGHT: 68 IN | BODY MASS INDEX: 33.46 KG/M2 | WEIGHT: 220.8 LBS

## 2019-01-03 DIAGNOSIS — I10 ESSENTIAL HYPERTENSION: ICD-10-CM

## 2019-01-03 DIAGNOSIS — F41.8 DEPRESSION WITH ANXIETY: Primary | ICD-10-CM

## 2019-01-03 DIAGNOSIS — R09.81 NASAL CONGESTION: ICD-10-CM

## 2019-01-03 PROCEDURE — 99214 OFFICE O/P EST MOD 30 MIN: CPT | Performed by: NURSE PRACTITIONER

## 2019-01-03 RX ORDER — VENLAFAXINE HYDROCHLORIDE 37.5 MG/1
37.5 CAPSULE, EXTENDED RELEASE ORAL DAILY
COMMUNITY
End: 2019-03-04

## 2019-01-08 ENCOUNTER — TELEPHONE (OUTPATIENT)
Dept: INTERNAL MEDICINE CLINIC | Age: 39
End: 2019-01-08

## 2019-01-09 ENCOUNTER — TELEPHONE (OUTPATIENT)
Dept: INTERNAL MEDICINE CLINIC | Age: 39
End: 2019-01-09

## 2019-01-14 ENCOUNTER — HOSPITAL ENCOUNTER (OUTPATIENT)
Dept: NON INVASIVE DIAGNOSTICS | Age: 39
Discharge: HOME | End: 2019-01-14
Payer: COMMERCIAL

## 2019-01-14 DIAGNOSIS — R07.9 CHEST PAIN IN ADULT: ICD-10-CM

## 2019-01-14 DIAGNOSIS — M79.602 LEFT ARM PAIN: ICD-10-CM

## 2019-01-14 PROCEDURE — 93017 CV STRESS TEST TRACING ONLY: CPT | Performed by: INTERNAL MEDICINE

## 2019-01-17 ENCOUNTER — OFFICE VISIT (OUTPATIENT)
Dept: INTERNAL MEDICINE CLINIC | Age: 39
End: 2019-01-17
Payer: COMMERCIAL

## 2019-01-17 VITALS
BODY MASS INDEX: 34.34 KG/M2 | HEIGHT: 68 IN | SYSTOLIC BLOOD PRESSURE: 128 MMHG | WEIGHT: 226.6 LBS | HEART RATE: 90 BPM | DIASTOLIC BLOOD PRESSURE: 86 MMHG | RESPIRATION RATE: 18 BRPM

## 2019-01-17 DIAGNOSIS — M79.602 LEFT ARM PAIN: ICD-10-CM

## 2019-01-17 DIAGNOSIS — F41.8 DEPRESSION WITH ANXIETY: Primary | ICD-10-CM

## 2019-01-17 DIAGNOSIS — R07.9 CHEST PAIN IN ADULT: ICD-10-CM

## 2019-01-17 DIAGNOSIS — I10 ESSENTIAL HYPERTENSION: ICD-10-CM

## 2019-01-17 PROCEDURE — 99213 OFFICE O/P EST LOW 20 MIN: CPT | Performed by: NURSE PRACTITIONER

## 2019-01-29 ENCOUNTER — OFFICE VISIT (OUTPATIENT)
Dept: INTERNAL MEDICINE CLINIC | Age: 39
End: 2019-01-29
Payer: COMMERCIAL

## 2019-01-29 VITALS
HEART RATE: 70 BPM | WEIGHT: 226 LBS | DIASTOLIC BLOOD PRESSURE: 85 MMHG | RESPIRATION RATE: 16 BRPM | SYSTOLIC BLOOD PRESSURE: 130 MMHG | HEIGHT: 68 IN | BODY MASS INDEX: 34.25 KG/M2

## 2019-01-29 DIAGNOSIS — I10 ESSENTIAL HYPERTENSION: ICD-10-CM

## 2019-01-29 DIAGNOSIS — N64.4 BREAST PAIN, LEFT: Primary | ICD-10-CM

## 2019-01-29 DIAGNOSIS — R09.81 NASAL CONGESTION: ICD-10-CM

## 2019-01-29 DIAGNOSIS — F41.8 DEPRESSION WITH ANXIETY: ICD-10-CM

## 2019-01-29 PROCEDURE — 99213 OFFICE O/P EST LOW 20 MIN: CPT | Performed by: NURSE PRACTITIONER

## 2019-01-30 ENCOUNTER — TELEPHONE (OUTPATIENT)
Dept: INTERNAL MEDICINE CLINIC | Age: 39
End: 2019-01-30

## 2019-01-30 ENCOUNTER — HOSPITAL ENCOUNTER (EMERGENCY)
Age: 39
Discharge: HOME | End: 2019-01-30
Attending: EMERGENCY MEDICINE
Payer: COMMERCIAL

## 2019-01-30 VITALS
BODY MASS INDEX: 34.53 KG/M2 | HEIGHT: 67 IN | RESPIRATION RATE: 18 BRPM | DIASTOLIC BLOOD PRESSURE: 84 MMHG | WEIGHT: 220 LBS | OXYGEN SATURATION: 100 % | TEMPERATURE: 98.6 F | HEART RATE: 118 BPM | SYSTOLIC BLOOD PRESSURE: 139 MMHG

## 2019-01-30 DIAGNOSIS — J20.9 ACUTE BRONCHITIS DUE TO INFECTION: ICD-10-CM

## 2019-01-30 DIAGNOSIS — H65.01 ACUTE SEROUS OTITIS MEDIA OF RIGHT EAR WITHOUT RUPTURE: Primary | ICD-10-CM

## 2019-01-30 DIAGNOSIS — J02.9 ACUTE PHARYNGITIS, UNSPECIFIED ETIOLOGY: ICD-10-CM

## 2019-01-30 DIAGNOSIS — F17.200 TOBACCO USE DISORDER: ICD-10-CM

## 2019-01-30 DIAGNOSIS — N64.4 BREAST PAIN, LEFT: Primary | ICD-10-CM

## 2019-01-30 PROCEDURE — 99212 OFFICE O/P EST SF 10 MIN: CPT

## 2019-01-30 PROCEDURE — 99213 OFFICE O/P EST LOW 20 MIN: CPT | Performed by: EMERGENCY MEDICINE

## 2019-01-30 RX ORDER — PROMETHAZINE HYDROCHLORIDE AND CODEINE PHOSPHATE 6.25; 1 MG/5ML; MG/5ML
5 SYRUP ORAL 4 TIMES DAILY PRN
Qty: 120 ML | Refills: 0 | Status: SHIPPED | OUTPATIENT
Start: 2019-01-30 | End: 2019-02-02

## 2019-01-30 RX ORDER — DOXYCYCLINE HYCLATE 100 MG
100 TABLET ORAL 2 TIMES DAILY
Qty: 14 TABLET | Refills: 0 | Status: SHIPPED | OUTPATIENT
Start: 2019-01-30 | End: 2019-02-06

## 2019-01-30 RX ORDER — BENZONATATE 100 MG/1
100 CAPSULE ORAL 3 TIMES DAILY PRN
Qty: 15 CAPSULE | Refills: 0 | Status: SHIPPED | OUTPATIENT
Start: 2019-01-30 | End: 2019-03-04 | Stop reason: ALTCHOICE

## 2019-01-30 RX ORDER — DIPHENHYDRAMINE HCL 25 MG
25 CAPSULE ORAL EVERY 6 HOURS PRN
COMMUNITY
End: 2019-04-29 | Stop reason: ALTCHOICE

## 2019-01-30 ASSESSMENT — ENCOUNTER SYMPTOMS
BACK PAIN: 0
SINUS PRESSURE: 1
FACIAL SWELLING: 0
EYE PAIN: 0
CONSTIPATION: 0
TROUBLE SWALLOWING: 0
ABDOMINAL PAIN: 0
WHEEZING: 0
COUGH: 1
VOMITING: 0
VOICE CHANGE: 0
DIARRHEA: 0
STRIDOR: 0
EYE DISCHARGE: 0
NAUSEA: 0
EYE REDNESS: 0
CHOKING: 0
BLOOD IN STOOL: 0
RHINORRHEA: 1
SHORTNESS OF BREATH: 0
SORE THROAT: 1

## 2019-02-05 ENCOUNTER — HOSPITAL ENCOUNTER (OUTPATIENT)
Dept: WOMENS IMAGING | Age: 39
Discharge: HOME | End: 2019-02-05
Payer: COMMERCIAL

## 2019-02-05 DIAGNOSIS — N64.4 BREAST PAIN, LEFT: ICD-10-CM

## 2019-02-05 DIAGNOSIS — R92.2 BREAST DENSITY: ICD-10-CM

## 2019-02-05 PROCEDURE — G0279 TOMOSYNTHESIS, MAMMO: HCPCS

## 2019-02-05 PROCEDURE — 76642 ULTRASOUND BREAST LIMITED: CPT

## 2019-03-04 ENCOUNTER — OFFICE VISIT (OUTPATIENT)
Dept: INTERNAL MEDICINE CLINIC | Age: 39
End: 2019-03-04
Payer: COMMERCIAL

## 2019-03-04 VITALS
HEIGHT: 67 IN | BODY MASS INDEX: 36.73 KG/M2 | DIASTOLIC BLOOD PRESSURE: 74 MMHG | RESPIRATION RATE: 16 BRPM | HEART RATE: 80 BPM | SYSTOLIC BLOOD PRESSURE: 128 MMHG | WEIGHT: 234 LBS

## 2019-03-04 DIAGNOSIS — F41.8 DEPRESSION WITH ANXIETY: ICD-10-CM

## 2019-03-04 DIAGNOSIS — I10 ESSENTIAL HYPERTENSION: Primary | ICD-10-CM

## 2019-03-04 DIAGNOSIS — F17.200 SMOKER: ICD-10-CM

## 2019-03-04 PROCEDURE — 99213 OFFICE O/P EST LOW 20 MIN: CPT | Performed by: NURSE PRACTITIONER

## 2019-03-04 SDOH — HEALTH STABILITY: MENTAL HEALTH: HOW OFTEN DO YOU HAVE A DRINK CONTAINING ALCOHOL?: MONTHLY OR LESS

## 2019-03-04 ASSESSMENT — PATIENT HEALTH QUESTIONNAIRE - PHQ9
SUM OF ALL RESPONSES TO PHQ QUESTIONS 1-9: 0
SUM OF ALL RESPONSES TO PHQ9 QUESTIONS 1 & 2: 0
1. LITTLE INTEREST OR PLEASURE IN DOING THINGS: 0
2. FEELING DOWN, DEPRESSED OR HOPELESS: 0
SUM OF ALL RESPONSES TO PHQ QUESTIONS 1-9: 0

## 2019-03-11 ENCOUNTER — TELEPHONE (OUTPATIENT)
Dept: INTERNAL MEDICINE CLINIC | Age: 39
End: 2019-03-11

## 2019-03-11 DIAGNOSIS — R03.0 BLOOD PRESSURE ELEVATED WITHOUT HISTORY OF HTN: Primary | ICD-10-CM

## 2019-03-11 DIAGNOSIS — I10 ESSENTIAL HYPERTENSION: ICD-10-CM

## 2019-04-26 ENCOUNTER — TELEPHONE (OUTPATIENT)
Dept: INTERNAL MEDICINE CLINIC | Age: 39
End: 2019-04-26

## 2019-04-26 NOTE — TELEPHONE ENCOUNTER
Bettie left a message on medical asst line to give us a heads up. The people from [facility] may or may not contact poly.  She put 2 days per episode per month and pt said she used this up arelady. She states her breast pain has sifted over to the other breast.  She has also been having problems with anxiety and depression. She has been trying to deal with it on her own and has made an appt with gael for 4/29/19 regarding the anxiety and depression.

## 2019-04-29 ENCOUNTER — OFFICE VISIT (OUTPATIENT)
Dept: INTERNAL MEDICINE CLINIC | Age: 39
End: 2019-04-29
Payer: COMMERCIAL

## 2019-04-29 VITALS
DIASTOLIC BLOOD PRESSURE: 86 MMHG | SYSTOLIC BLOOD PRESSURE: 142 MMHG | BODY MASS INDEX: 37.2 KG/M2 | HEIGHT: 67 IN | WEIGHT: 237 LBS | HEART RATE: 74 BPM | RESPIRATION RATE: 14 BRPM

## 2019-04-29 DIAGNOSIS — F41.9 ANXIETY DISORDER, UNSPECIFIED TYPE: ICD-10-CM

## 2019-04-29 DIAGNOSIS — F43.0 PANIC ATTACK DUE TO EXCEPTIONAL STRESS: ICD-10-CM

## 2019-04-29 DIAGNOSIS — F41.0 PANIC ATTACK DUE TO EXCEPTIONAL STRESS: ICD-10-CM

## 2019-04-29 DIAGNOSIS — M25.561 RIGHT KNEE PAIN, UNSPECIFIED CHRONICITY: Primary | ICD-10-CM

## 2019-04-29 PROCEDURE — 99213 OFFICE O/P EST LOW 20 MIN: CPT | Performed by: NURSE PRACTITIONER

## 2019-04-29 RX ORDER — HYDROXYZINE HYDROCHLORIDE 10 MG/1
10 TABLET, FILM COATED ORAL EVERY 8 HOURS PRN
Qty: 30 TABLET | Refills: 0 | Status: SHIPPED | OUTPATIENT
Start: 2019-04-29 | End: 2019-05-09

## 2019-04-29 NOTE — PATIENT INSTRUCTIONS
ACE wrap or sleeve to support the knee  Elevate up on pillow when youre at rest  Use heat 20 minutes every 2 hours  Tylenol and Ibuprofen for pain and to reduce inflammation    Ok to use Hydroxyzine as needed. Patient Education        Knee Pain or Injury: Care Instructions  Your Care Instructions    Injuries are a common cause of knee problems. Sudden (acute) injuries may be caused by a direct blow to the knee. They can also be caused by abnormal twisting, bending, or falling on the knee. Pain, bruising, or swelling may be severe, and may start within minutes of the injury. Overuse is another cause of knee pain. Other causes are climbing stairs, kneeling, and other activities that use the knee. Everyday wear and tear, especially as you get older, also can cause knee pain. Rest, along with home treatment, often relieves pain and allows your knee to heal. If you have a serious knee injury, you may need tests and treatment. Follow-up care is a key part of your treatment and safety. Be sure to make and go to all appointments, and call your doctor if you are having problems. It's also a good idea to know your test results and keep a list of the medicines you take. How can you care for yourself at home? · Be safe with medicines. Read and follow all instructions on the label. ? If the doctor gave you a prescription medicine for pain, take it as prescribed. ? If you are not taking a prescription pain medicine, ask your doctor if you can take an over-the-counter medicine. · Rest and protect your knee. Take a break from any activity that may cause pain. · Put ice or a cold pack on your knee for 10 to 20 minutes at a time. Put a thin cloth between the ice and your skin. · Prop up a sore knee on a pillow when you ice it or anytime you sit or lie down for the next 3 days. Try to keep it above the level of your heart. This will help reduce swelling.   · If your knee is not swollen, you can put moist heat, a heating pad, or a warm cloth on your knee. · If your doctor recommends an elastic bandage, sleeve, or other type of support for your knee, wear it as directed. · Follow your doctor's instructions about how much weight you can put on your leg. Use a cane, crutches, or a walker as instructed. · Follow your doctor's instructions about activity during your healing process. If you can do mild exercise, slowly increase your activity. · Reach and stay at a healthy weight. Extra weight can strain the joints, especially the knees and hips, and make the pain worse. Losing even a few pounds may help. When should you call for help? Call 911 anytime you think you may need emergency care. For example, call if:    · You have symptoms of a blood clot in your lung (called a pulmonary embolism). These may include:  ? Sudden chest pain. ? Trouble breathing. ? Coughing up blood.    Call your doctor now or seek immediate medical care if:    · You have severe or increasing pain.     · Your leg or foot turns cold or changes color.     · You cannot stand or put weight on your knee.     · Your knee looks twisted or bent out of shape.     · You cannot move your knee.     · You have signs of infection, such as:  ? Increased pain, swelling, warmth, or redness. ? Red streaks leading from the knee. ? Pus draining from a place on your knee. ? A fever.     · You have signs of a blood clot in your leg (called a deep vein thrombosis), such as:  ? Pain in your calf, back of the knee, thigh, or groin. ? Redness and swelling in your leg or groin.    Watch closely for changes in your health, and be sure to contact your doctor if:    · You have tingling, weakness, or numbness in your knee.     · You have any new symptoms, such as swelling.     · You have bruises from a knee injury that last longer than 2 weeks.     · You do not get better as expected. Where can you learn more? Go to https://[facility]el[facility]. org and sign in to your [employer] account. Enter K195 in the [facility] box to learn more about \"Knee Pain or Injury: Care Instructions. \"     If you do not have an account, please click on the \"Sign Up Now\" link. Current as of: September 23, 2018  Content Version: 11.9  © 7891-5588 [employer]. Care instructions adapted under license by [facility] ([facility]). If you have questions about a medical condition or this instruction, always ask your healthcare professional. [address] any warranty or liability for your use of this information.

## 2019-04-29 NOTE — PROGRESS NOTES
[facility] PROFESSIONAL SERVS  PHYSICIANS [facility] [address]  Dept: [phone]  Dept Fax: [phone]      Starla Saha  43 y.o.  [id number]  4/29/19    Chief Complaint   Patient presents with    Anxiety    Depression    Other     tore ACL in 2009, but has been painful last 2 months-requesting referral    Other     went over FMLA days off this month       Current concerns - Starla Saha presents for follow up of depression/anxiety. This recurred due to finding a letter from her fiancee recently and trying to clean out his clothes. She does not wish to restart SSRI, but is using Hydroxyzine PRN and feels this is adequate for her. Denies suicidal ideation. Tore ACL 2009, did therapy and a brace and did well afterwards. 2 years ago, states she is having left knee pain. Starting last year February this recurred. Xrays done. Recurred again 2 weeks ago, improved somewhat with Tylenol, having to adjust her walk to accommodate. Denies pain, but is aching at this time, improves with rest, worsens with activity especially up walking and lifting. Past Medical History:   Diagnosis Date    Essential hypertension 12/11/2018       Prior to Admission medications    Medication Sig Start Date End Date Taking?  Authorizing Provider   lisinopril (PRINIVIL;ZESTRIL) 10 MG tablet Take 1 tablet by mouth daily 12/27/18  Yes TO Wilcox - CNP   acetaminophen (TYLENOL) 500 MG tablet Take 500 mg by mouth every 6 hours as needed for Pain   Yes Historical Provider, MD   etodolac (LODINE) 400 MG tablet Take 1 tablet by mouth 2 times daily for 20 doses 5/15/19 5/25/19  Keegan Dominguez MD        No Known Allergies    Review of Systems - General ROS: negative for - chills, fatigue, fever or malaise  Psychological ROS: positive for - anxiety and depression  negative for - sleep disturbances or suicidal ideation  Hematological and Lymphatic ROS: No history of blood clots or bleeding disorder. Respiratory ROS: no cough, shortness of breath, or wheezing  Cardiovascular ROS: no chest pain or dyspnea on exertion  Gastrointestinal ROS: no abdominal pain, change in bowel habits, or black or bloody stools  Genito-Urinary ROS: no dysuria, trouble voiding, or hematuria  Musculoskeletal ROS: Positive for - joint pain left knee. Negative for muscle weakness, muscle pain. Neurological ROS: negative for - dizziness, gait disturbance, headaches, impaired coordination/balance, numbness/tingling or weakness  Dermatological ROS: negative for - rash or wounds    Blood pressure (!) 142/86, pulse 74, resp. rate 14, height 5' 7.01\" (1.702 m), weight 237 lb (107.5 kg), not currently breastfeeding. Physical Examination:   Constitutional - Alert, cooperative, well groomed, and in no distress. Vital signs stable   Vitals:    04/29/19 1540   BP: (!) 142/86   Pulse:    Resp:      Mental status - Alert and oriented to person, place, and time. Good insight, appropriate responses, mood appropriate. Head - Atraumatic, normocephalic. Eyes - Pupils equal and round  Ears - External ears are normal, hearing is grossly normal to speech  Mouth - Oropharynx clear, mucous membranes pink and moist, dentition intact. Neck - supple, no significant adenopathy, no JVD. Chest - No distress. No increased work of breathing. Clear to auscultation in all fields, no wheezes, rales or rhonchi, symmetric air entry. Heart - normal rate, regular rhythm, normal S1, S2, no murmurs, rubs  or gallops  Abdomen -soft, nontender, nondistended  Neurological - alert, oriented, cranial nerves II-XII intact without noted deficits, motor and sensation are grossly intact bilateral upper and lower extremities. Extremities - peripheral pulses normal, no pedal edema, no clubbing or cyanosis.  Left knee with crepitus, no point tenderness, generally tender knee joint with ROM and pressure  Skin - warm and dry, no rashes, lesions, or wounds evident on exposed skin    Diagnostic Data:  I have reviewed recent diagnostic testing including labs, EKG, radiology results. Please see EKG for interpretation. Lab Results   Component Value Date     12/23/2018    K 3.5 12/23/2018     12/23/2018    CO2 24 12/23/2018    BUN 8 12/23/2018    CREATININE 0.75 12/23/2018    GLUCOSE 101 12/23/2018    CALCIUM 9.3 12/23/2018      No results found for: LABA1C  No results found for: EAG  No results found for: CHOL  No results found for: TRIG  No results found for: HDL  No results found for: LDLCHOLESTEROL, LDLCALC  No results found for: LABVLDL, VLDL  No results found for: [facility]  Lab Results   Component Value Date    WBC 10.0 12/23/2018    HGB 13.5 12/23/2018    HCT 40.4 12/23/2018    MCV 90.2 12/23/2018     12/23/2018         Assessment/Plan:   Diagnosis Orders   1. Right knee pain, unspecified chronicity  External Referral To Orthopedic Surgery   2. Panic attack due to exceptional stress  hydrOXYzine (ATARAX) 10 MG tablet   3. Anxiety disorder, unspecified type  hydrOXYzine (ATARAX) 10 MG tablet       Orders Placed This Encounter   Procedures    External Referral To Orthopedic Surgery     Referral Priority:   Routine     Referral Type:   Eval and Treat     Referral Reason:   Specialty Services Required     Requested Specialty:   Orthopedic Surgery     Number of Visits Requested:   1     ACE wrap or sleeve to support the knee  Elevate up on pillow when youre at rest  Use heat 20 minutes every 2 hours  Tylenol and Ibuprofen for pain and to reduce inflammation    Ok to use Hydroxyzine as needed. Will schedule follow up appointment in 3 months, sooner if problems. Electronically signed by TO Oneal CNP 05/27/19 10:59 PM     [address]  [address], [address]     Phone number: [phone]  Fax number: [phone]

## 2019-05-07 ENCOUNTER — TELEPHONE (OUTPATIENT)
Dept: INTERNAL MEDICINE CLINIC | Age: 39
End: 2019-05-07

## 2019-05-07 NOTE — TELEPHONE ENCOUNTER
Left message for the patient to get back with us on who she wants to be referred to for orthopedic. [facility] are the only physicians in [facility] [address]. Need to see if she wants to go to [facility] or any one of her choice.

## 2019-05-15 ENCOUNTER — HOSPITAL ENCOUNTER (EMERGENCY)
Age: 39
Discharge: HOME | End: 2019-05-15
Attending: EMERGENCY MEDICINE
Payer: COMMERCIAL

## 2019-05-15 VITALS
SYSTOLIC BLOOD PRESSURE: 140 MMHG | DIASTOLIC BLOOD PRESSURE: 78 MMHG | TEMPERATURE: 98.2 F | HEART RATE: 78 BPM | OXYGEN SATURATION: 98 % | RESPIRATION RATE: 16 BRPM

## 2019-05-15 DIAGNOSIS — M17.12 PRIMARY OSTEOARTHRITIS OF LEFT KNEE: ICD-10-CM

## 2019-05-15 DIAGNOSIS — S86.912A KNEE STRAIN, LEFT, INITIAL ENCOUNTER: Primary | ICD-10-CM

## 2019-05-15 DIAGNOSIS — F17.200 TOBACCO USE DISORDER: ICD-10-CM

## 2019-05-15 PROCEDURE — 99212 OFFICE O/P EST SF 10 MIN: CPT

## 2019-05-15 PROCEDURE — 99214 OFFICE O/P EST MOD 30 MIN: CPT | Performed by: EMERGENCY MEDICINE

## 2019-05-15 RX ORDER — ETODOLAC 400 MG/1
400 TABLET, FILM COATED ORAL 2 TIMES DAILY
Qty: 20 TABLET | Refills: 0 | Status: SHIPPED | OUTPATIENT
Start: 2019-05-15 | End: 2019-11-11

## 2019-05-15 ASSESSMENT — ENCOUNTER SYMPTOMS
CONSTIPATION: 0
NAUSEA: 0
CHOKING: 0
SORE THROAT: 0
STRIDOR: 0
ABDOMINAL PAIN: 0
VOMITING: 0
SHORTNESS OF BREATH: 0
VOICE CHANGE: 0
FACIAL SWELLING: 0
DIARRHEA: 0
WHEEZING: 0
BLOOD IN STOOL: 0
EYE PAIN: 0
EYE DISCHARGE: 0
COUGH: 0
SINUS PRESSURE: 0
TROUBLE SWALLOWING: 0
EYE REDNESS: 0
BACK PAIN: 0

## 2019-05-15 ASSESSMENT — PAIN DESCRIPTION - PAIN TYPE: TYPE: ACUTE PAIN

## 2019-05-15 ASSESSMENT — PAIN DESCRIPTION - ORIENTATION: ORIENTATION: LEFT

## 2019-05-15 ASSESSMENT — PAIN SCALES - GENERAL: PAINLEVEL_OUTOF10: 10

## 2019-05-15 ASSESSMENT — PAIN DESCRIPTION - LOCATION: LOCATION: KNEE

## 2019-05-15 ASSESSMENT — PAIN DESCRIPTION - FREQUENCY: FREQUENCY: CONTINUOUS

## 2019-05-15 NOTE — ED TRIAGE NOTES
Amita Adjutant arrives by self  to room with complaint of l knee pain symptoms started 3 weeks ago. 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

## 2019-05-15 NOTE — ED PROVIDER NOTES
[address]       Chief Complaint   Patient presents with    Knee Pain     left       Nurses Notes reviewed and I agree except as noted in the HPI. HISTORY OF PRESENT ILLNESS   Jordyn Ritter is a 43 y.o. female who presents with three-week history of increasingly severe left knee pain, currently  Rated at 10 out of 10 severity. She is unable to do her work. May have sustained twisting injury 2 days ago to exacerbate pain but has history of severe tricompartmental osteoarthritis and has been referred to orthopedic specialist.  Currently taking Tylenol without relief. No fever, vomiting, fall to the ground, locking up, motor sensory deficit, calf pain or calf swelling, chest pain, shortness of breath, dizziness, syncope. History of hypertension. Last GFR normal December 2018. No history of diabetes or renal insufficiency. Smokes cigarettes   REVIEW OF SYSTEMS     Review of Systems   Constitutional: Negative for appetite change, chills, fatigue, fever and unexpected weight change. HENT: Negative for congestion, ear discharge, ear pain, facial swelling, hearing loss, nosebleeds, postnasal drip, sinus pressure, sore throat, trouble swallowing and voice change. Eyes: Negative for pain, discharge, redness and visual disturbance. Respiratory: Negative for cough, choking, shortness of breath, wheezing and stridor. Cardiovascular: Negative for chest pain, palpitations and leg swelling. Gastrointestinal: Negative for abdominal pain, blood in stool, constipation, diarrhea, nausea and vomiting. Genitourinary: Negative for dysuria, flank pain, frequency, hematuria, urgency, vaginal bleeding and vaginal discharge. Musculoskeletal: Negative for arthralgias, back pain, neck pain and neck stiffness. Severe left knee pain and swelling   Skin: Negative for rash.    Neurological: Negative for dizziness, seizures, syncope, weakness, light-headedness and headaches. Hematological: Negative for adenopathy. Does not bruise/bleed easily. Psychiatric/Behavioral: Negative for confusion, sleep disturbance and suicidal ideas. The patient is not nervous/anxious. All other systems reviewed and are negative. PAST MEDICAL HISTORY         Diagnosis Date    Essential hypertension 2018       SURGICAL HISTORY     Patient  has a past surgical history that includes Tubal ligation () and  section (). CURRENT MEDICATIONS       Discharge Medication List as of 5/15/2019  8:28 AM      CONTINUE these medications which have NOT CHANGED    Details   lisinopril (PRINIVIL;ZESTRIL) 10 MG tablet Take 1 tablet by mouth daily, Disp-30 tablet, R-5Phone In      acetaminophen (TYLENOL) 500 MG tablet Take 500 mg by mouth every 6 hours as needed for PainHistorical Med             ALLERGIES     Patient is has No Known Allergies. FAMILY HISTORY     Patient'sfamily history includes Cancer in her paternal grandmother; Cancer (age of onset: 54) in her maternal grandfather; Diabetes (age of onset: 36) in her mother; Heart Attack in her paternal grandmother; Heart Attack (age of onset: 36) in her father; Heart Disease in her paternal grandmother; High Blood Pressure in her mother. SOCIAL HISTORY     Patient  reports that she has been smoking cigarettes. She has a 3.00 pack-year smoking history. She has never used smokeless tobacco. She reports that she drinks alcohol. She reports that she does not use drugs. PHYSICAL EXAM     ED TRIAGE VITALS  BP: (!) 140/78, Temp: 98.2 °F (36.8 °C), Pulse: 78, Resp: 16, SpO2: 98 %  Physical Exam   Constitutional: She is oriented to person, place, and time. She appears well-developed and well-nourished. She appears distressed. Tearful in distress, with left knee pain moist membranes   HENT:   Head: Normocephalic and atraumatic.    Right Ear: Tympanic membrane and external ear normal.   Left Ear: Tympanic membrane and external ear normal.   Nose: Nose normal.   Mouth/Throat: Oropharynx is clear and moist. No trismus in the jaw. No uvula swelling. No oropharyngeal exudate, posterior oropharyngeal edema, posterior oropharyngeal erythema or tonsillar abscesses. Eyes: Pupils are equal, round, and reactive to light. Conjunctivae and EOM are normal. Right eye exhibits no discharge. Left eye exhibits no discharge. Right conjunctiva is not injected. Left conjunctiva is not injected. No scleral icterus. Sclera clear   Neck: Normal range of motion. No JVD present. No spinous process tenderness and no muscular tenderness present. Normal range of motion present. No thyromegaly present. No meningismus   Cardiovascular: Normal rate, regular rhythm, S1 normal, S2 normal, normal heart sounds, intact distal pulses and normal pulses. Exam reveals no gallop and no friction rub. No murmur heard. Pulmonary/Chest: Effort normal and breath sounds normal. No stridor. No respiratory distress. She has no wheezes. She has no rales. She exhibits no tenderness. Cough, lungs clear   Abdominal: Soft. Bowel sounds are normal. She exhibits no distension and no mass. There is no tenderness. There is no rebound and no guarding. Soft nontender   Musculoskeletal: She exhibits no edema. Left knee: She exhibits decreased range of motion and swelling. She exhibits no effusion, no ecchymosis and no bony tenderness. Tenderness found. Medial joint line, lateral joint line and patellar tendon tenderness noted. Right lower leg: Normal.        Left lower leg: Normal.   Generalized tenderness with some effusion. No septic joint. No instability or locking up. No evidence of DVT. Distal neurovascular intact   Lymphadenopathy:     She has no cervical adenopathy. Right cervical: No superficial cervical and no deep cervical adenopathy present.        Left cervical: No superficial cervical and no deep cervical adenopathy present. Neurological: She is alert and oriented to person, place, and time. She has normal reflexes. She displays normal reflexes. No cranial nerve deficit. She exhibits normal muscle tone. Coordination normal.   Appropriate,, gait painful, no focal findings   Skin: Skin is warm and dry. No rash noted. No erythema. No rash or bruising   Psychiatric: She has a normal mood and affect. Her behavior is normal. Judgment and thought content normal.   Nursing note and vitals reviewed. DIAGNOSTIC RESULTS   Labs: No results found for this visit on 05/15/19. IMAGING:  No orders to display     URGENT CARE COURSE:     Vitals:    05/15/19 0810   BP: (!) 140/78   Pulse: 78   Resp: 16   Temp: 98.2 °F (36.8 °C)   TempSrc: Temporal   SpO2: 98%       Medications - No data to display  PROCEDURES:  None  FINALIMPRESSION      1. Knee strain, left, initial encounter    2. Primary osteoarthritis of left knee    3. Tobacco use disorder        DISPOSITION/PLAN   DISPOSITION Decision To Discharge 05/15/2019 08:23:34 AM  nontoxic, well-hydrated, normal airway. No sepsis or CNS infection. No evidence of infectious process or neurovascular complication/DVT. No PAD. Pt has knee sprain and strain with overuse syndrome that has exacerbated severe osteoarthritis. Will treat with Lodine, Tylenol, heat, rest.  Patient to recheck with PCP within the next 48 hours for reevaluation and specialty referral ASAP. Patient understands to go to ED if worse. In addition, she understands importance of smoke cessation and was given written instructions to quit smoking.     PATIENT REFERRED TO:  TO Reece [address] [facility]    Schedule an appointment as soon as possible for a visit in 1 day  Recheck in office, go to emergency if worse    DISCHARGE MEDICATIONS:  Discharge Medication List as of 5/15/2019  8:28 AM      START taking these medications    Details   etodolac (LODINE) 400 MG tablet Take 1 tablet by mouth 2 times daily for 20 doses, Disp-20 tablet, R-0Print           Discharge Medication List as of 5/15/2019  8:28 AM          MD Popeye Resendiz MD  05/15/19 [id number]

## 2019-05-15 NOTE — LETTER
[address] [facility]  [address] [facility] [address] [address]  Phone: [phone]               May 15, 2019    Patient: Lori Hutchinson   YOB: 1980   Date of Visit: 5/15/2019       To Whom It May Concern:    Elodia Coelho was seen and treated in our emergency department on 5/15/2019. She may return to work on 5/17/19.   No work May 15 and May 16, 2019    Sincerely,       Carmina Randall MD         Signature:__________________________________

## 2019-05-16 ENCOUNTER — TELEPHONE (OUTPATIENT)
Dept: INTERNAL MEDICINE CLINIC | Age: 39
End: 2019-05-16

## 2019-05-20 ENCOUNTER — OFFICE VISIT (OUTPATIENT)
Dept: INTERNAL MEDICINE CLINIC | Age: 39
End: 2019-05-20
Payer: COMMERCIAL

## 2019-05-20 VITALS
SYSTOLIC BLOOD PRESSURE: 130 MMHG | HEIGHT: 67 IN | RESPIRATION RATE: 14 BRPM | DIASTOLIC BLOOD PRESSURE: 76 MMHG | BODY MASS INDEX: 37.73 KG/M2 | HEART RATE: 68 BPM | WEIGHT: 240.4 LBS

## 2019-05-20 DIAGNOSIS — M70.921: ICD-10-CM

## 2019-05-20 DIAGNOSIS — M17.12 TRICOMPARTMENT OSTEOARTHRITIS OF LEFT KNEE: Primary | ICD-10-CM

## 2019-05-20 DIAGNOSIS — M77.11 EPICONDYLITIS, LATERAL, RIGHT: ICD-10-CM

## 2019-05-20 PROCEDURE — 99213 OFFICE O/P EST LOW 20 MIN: CPT | Performed by: NURSE PRACTITIONER

## 2019-05-20 NOTE — PROGRESS NOTES
[facility]  PHYSICIANS [facility] [address], [address]  Dept: [phone]  Dept Fax: [phone]      Ella Christianson  43 y.o.  [id number]  5/20/19    Chief Complaint   Patient presents with    Follow-Up from Hospital     left knee pain    Other     still has not received referral to ortho     Chief Complaint   Patient presents with    Knee Pain       left         Nurses Notes reviewed and I agree except as noted in the HPI. HISTORY OF PRESENT ILLNESS   Ella Christianson is a 43 y.o. female who presents with three-week history of increasingly severe left knee pain, currently  Rated at 10 out of 10 severity. She is unable to do her work. May have sustained twisting injury 2 days ago to exacerbate pain but has history of severe tricompartmental osteoarthritis and has been referred to orthopedic specialist.  Currently taking Tylenol without relief. No fever, vomiting, fall to the ground, locking up, motor sensory deficit, calf pain or calf swelling, chest pain, shortness of breath, dizziness, syncope. History of hypertension. Last GFR normal December 2018. No history of diabetes or renal insufficiency. Smokes cigarettes  FINALIMPRESSION       1. Knee strain, left, initial encounter    2. Primary osteoarthritis of left knee    3. Tobacco use disorder          DISPOSITION/PLAN   DISPOSITION Decision To Discharge 05/15/2019 08:23:34 AM  nontoxic, well-hydrated, normal airway. No sepsis or CNS infection. No evidence of infectious process or neurovascular complication/DVT. No PAD. Pt has knee sprain and strain with overuse syndrome that has exacerbated severe osteoarthritis. Will treat with Lodine, Tylenol, heat, rest.  Patient to recheck with PCP within the next 48 hours for reevaluation and specialty referral ASAP. Patient understands to go to ED if worse.   In addition, she understands importance of smoke cessation and was given written instructions to quit smoking.     PATIENT REFERRED TO:  Nita Aguayo, APRN - CNP  [address]  [place] [address]  [phone]     Schedule an appointment as soon as possible for a visit in 1 day  Recheck in office, go to emergency if worse     DISCHARGE MEDICATIONS:      Discharge Medication List as of 5/15/2019  8:28 AM           START taking these medications     Details   etodolac (LODINE) 400 MG tablet Take 1 tablet by mouth 2 times daily for 20 doses, Disp-20 tablet, R-0Print                   Current concerns - Darryl Shepherd presents for  follow up. She was seen there on 5/15/19, due to left knee pain 10/10 which made it intolerable to walk and continue to perform her job functions. She had left knee pain, which she states is related to a job she has been performing at work which requires a lot of bending, lifting 50-60# from overhead and then kneeling while lifting as well. She has been placed on a stationary job since coming back from her UC visit, and has been taking etodolac which has helped some. She also had right arm pain and swelling, which has improved. Left knee pain - 2-3/10 on etodolac, aching in nature at this time, if she twists or turns too quickly this increased pain as well as getting on her knees. This is much improved from Wednesday with etodolac. Has also been using heat and tylenol, swelling has improved. Right arm pain - 5/10, aching, sharp at times, right middle forearm extending down from right lateral elbow improved by rest, worsened by reaching/lifting and with repetitive movement. Needs appointment scheduled with orthopedics. [address]. Past Medical History:   Diagnosis Date    Essential hypertension 12/11/2018          Prior to Admission medications    Medication Sig Start Date End Date Taking?  Authorizing Provider   etodolac (LODINE) 400 MG tablet Take 1 tablet by mouth 2 times daily for 20 doses 5/15/19 5/25/19 Yes Rigo Burris MD   lisinopril nondistended  Neurological - alert, oriented, cranial nerves II-XII intact without noted deficits, motor and sensation are grossly intact bilateral upper and lower extremities. Extremities - peripheral pulses normal, no pedal edema, no clubbing or cyanosis. Some crepitus noted in bilateral knees lt/rt, ROM limited slightly due to pain with extension, negative drawer. Right arm with full ROM, pain exhibited on overhead reach and internal rotation at mid forearm radiating from lateral epicondyle with tenderness to pressure, positive cozens sign. No reduced  strength. No numbness/tingling reported on exam.   Skin - warm and dry, no rashes, lesions, or wounds evident on exposed skin    Diagnostic Data:  I have reviewed recent diagnostic testing including labs, radiology reports and images. Lab Results   Component Value Date     12/23/2018    K 3.5 12/23/2018     12/23/2018    CO2 24 12/23/2018    BUN 8 12/23/2018    CREATININE 0.75 12/23/2018    GLUCOSE 101 12/23/2018    CALCIUM 9.3 12/23/2018      No results found for: LABA1C  No results found for: EAG  No results found for: CHOL  No results found for: TRIG  No results found for: HDL  No results found for: LDLCHOLESTEROL, LDLCALC  No results found for: LABVLDL, VLDL  No results found for: [facility]  Lab Results   Component Value Date    WBC 10.0 12/23/2018    HGB 13.5 12/23/2018    HCT 40.4 12/23/2018    MCV 90.2 12/23/2018     12/23/2018 2/12/19     Narrative   PROCEDURE: XR KNEE LEFT (MIN 4 VIEWS)       CLINICAL INFORMATION: Pain following a fall one week ago       TECHNIQUE: 4 views of the left knee       COMPARISON: Left knee 6/17/2009       FINDINGS: There is no acute fracture or dislocation. Joint space narrowing and osteophyte formation are present at the medial and lateral compartments of the tibiofemoral joint and at the patellofemoral joint.  A well-corticated 3 mm ossific density    adjacent to the tibial spine may be the result of prior trauma and was also present on the prior study. A flabella is noted.           Impression   1. No acute fracture or dislocation. 2. Tricompartmental osteoarthritis. Assessment/Plan:   Diagnosis Orders   1. Tricompartment osteoarthritis of left knee     2. Epicondylitis, lateral, right     3. Unspecified soft tissue disorder related to use, overuse and pressure, right upper arm         No orders of the defined types were placed in this encounter. 1) Left knee tricompartmental O/A, left knee pain  Continue Etodolac (until completed) and Tylenol for pain. When Etodolac is gone, can restart Ibuprofen 400 mg every 4-6 hours with Tylenol 650 mg every 4-6 hours as needed for pain. Call if no improvement and will try dose pack. Heat and elevate left knee. Work restrictions to stationary position, no lifting while reaching, kneeling or bending until seen by orthopedics. 2) Lateral epicondylitis, right, likely due to overuse. Continue NSAID's at this time. Try tennis elbow band for right arm while working. If no improvement may need xrays, will request ortho to evaluate this at her appointment also if this continues. Will schedule follow up appointment in 1 months. Electronically signed by TO Oliveira CNP 05/20/19 11:35 AM     [address] [place] [place] [address], [address]     Phone number: [phone]  Fax number: [phone]

## 2019-05-20 NOTE — LETTER
[address] [facility]  [place] [address]  [address]  [address] [address]  Phone: [phone]  Fax: [address]        May 20, 2019     Patient: Geanie Curling   YOB: 1980   Date of Visit: 5/20/2019       To Whom it May Concern:    Femi Fagan was seen in my clinic on 5/20/2019. WORK RESTRICTIONS TO STATIONARY POSITION, NO LIFTING WHILE REACHING, KNEELING OR BENDING UNTIL SEEN BY ORTHOPAEDIC. If you have any questions or concerns, please don't hesitate to call.     Sincerely,         TO Camacho - CNP

## 2019-05-24 ENCOUNTER — TELEPHONE (OUTPATIENT)
Dept: PSYCHIATRY | Age: 39
End: 2019-05-24

## 2019-06-05 ENCOUNTER — OFFICE VISIT (OUTPATIENT)
Dept: INTERNAL MEDICINE CLINIC | Age: 39
End: 2019-06-05
Payer: COMMERCIAL

## 2019-06-05 VITALS
RESPIRATION RATE: 14 BRPM | SYSTOLIC BLOOD PRESSURE: 166 MMHG | WEIGHT: 242 LBS | BODY MASS INDEX: 37.98 KG/M2 | HEART RATE: 75 BPM | HEIGHT: 67 IN | DIASTOLIC BLOOD PRESSURE: 84 MMHG

## 2019-06-05 DIAGNOSIS — I10 ESSENTIAL HYPERTENSION: ICD-10-CM

## 2019-06-05 DIAGNOSIS — N92.6 IRREGULAR MENSTRUAL CYCLE: Primary | ICD-10-CM

## 2019-06-05 DIAGNOSIS — F41.8 DEPRESSION WITH ANXIETY: ICD-10-CM

## 2019-06-05 PROCEDURE — 99213 OFFICE O/P EST LOW 20 MIN: CPT | Performed by: NURSE PRACTITIONER

## 2019-06-05 NOTE — PROGRESS NOTES
[facility] PROFESSIONAL SERVS  PHYSICIANS [facility] [address]JESÚSSEBAST[address], [facility], [place], operated [facility]  Dept: [phone]  Dept Fax: [address] [place] [address]  43 y.o.  [phone]  19    Chief Complaint   Patient presents with    Other     irregular periods        Current concerns - Carlene Holguin presents for irregular menses. She is typically fairly regularly, at times would skip a month. She completed her menses a week ago Tuesday. She started again last night. This is not heavier than typical, more painful, no clotting. She is feeling fine. Missed her meds last night, and she is stressed, so BP is up some. Advised to take BP meds every day.          Past Medical History:   Diagnosis Date    Essential hypertension 2018        Past Surgical History:   Procedure Laterality Date     SECTION  2004/2006    x 2    TUBAL LIGATION          Family History   Problem Relation Age of Onset    Diabetes Mother 36    High Blood Pressure Mother     Heart Attack Father 36    Cancer Maternal Grandfather 54        lung    Heart Disease Paternal Grandmother     Cancer Paternal Grandmother         ovarian    Heart Attack Paternal Grandmother         Social History     Socioeconomic History    Marital status:      Spouse name: Not on file    Number of children: 3    Years of education: 15    Highest education level: Not on file   Occupational History    Occupation:      Comment: unemployeed at the [address] Financial resource strain: Not on file    Food insecurity:     Worry: Not on file     Inability: Not on file   [employer] needs:     Medical: Not on file     Non-medical: Not on file   Tobacco Use    Smoking status: Current Every Day Smoker     Packs/day: 0.30     Years: 10.00     Pack years: 3.00     Types: Cigarettes     Last attempt to quit: 2018     Years since quittin.5    Smokeless tobacco: Never Used    Tobacco comment: 3-4 cigarettes a day    Substance and Sexual Activity    Alcohol use: Yes     Alcohol/week: 0.0 oz     Frequency: Monthly or less     Comment: every now and then    Drug use: No    Sexual activity: Yes     Partners: Male   Lifestyle    Physical activity:     Days per week: Not on file     Minutes per session: Not on file    Stress: Not on file   Relationships    Social connections:     Talks on phone: Not on file     Gets together: Not on file     Attends Alevism service: Not on file     Active member of club or organization: Not on file     Attends meetings of clubs or organizations: Not on file     Relationship status: Not on file    Intimate partner violence:     Fear of current or ex partner: Not on file     Emotionally abused: Not on file     Physically abused: Not on file     Forced sexual activity: Not on file   Other Topics Concern    Not on file   Social History Narrative    Not on file       Prior to Admission medications    Medication Sig Start Date End Date Taking?  Authorizing Provider   lisinopril (PRINIVIL;ZESTRIL) 10 MG tablet Take 1 tablet by mouth daily 12/27/18  Yes TO Campos - CNP   acetaminophen (TYLENOL) 500 MG tablet Take 500 mg by mouth every 6 hours as needed for Pain   Yes Historical Provider, MD   etodolac (LODINE) 400 MG tablet Take 1 tablet by mouth 2 times daily for 20 doses 5/15/19 5/25/19  Allen Amos MD        No Known Allergies    Review of Systems - General ROS: negative for - chills, fatigue, fever or malaise  Respiratory ROS: no cough, shortness of breath, or wheezing  Cardiovascular ROS: no chest pain or dyspnea on exertion  Gastrointestinal ROS: no abdominal pain, change in bowel habits, or black or bloody stools  Genito-Urinary ROS: no dysuria, trouble voiding, or hematuria  Musculoskeletal ROS: negative for - joint pain, joint swelling, muscle pain or muscular weakness  Neurological ROS: negative for - confusion, dizziness, gait disturbance, headaches, impaired coordination/balance, memory loss or weakness  Dermatological ROS: negative for - rash or wounds    Blood pressure (!) 166/84, pulse 75, resp. rate 14, height 5' 7.01\" (1.702 m), weight 242 lb (109.8 kg), not currently breastfeeding. Physical Examination:   Constitutional - Alert, cooperative, well groomed, and in no distress. Vital signs stable   Vitals:    06/05/19 1439   BP: (!) 166/84   Pulse: 75   Resp: 14     Mental status - Alert and oriented to person, place, and time. Good insight, appropriate responses, mood appropriate. Head - Atraumatic, normocephalic. Eyes - Pupils equal and round  Ears - External ears are normal, hearing is grossly normal to speech  Mouth - Oropharynx clear, mucous membranes pink and moist, dentition intact. Neck - supple, no significant adenopathy, no JVD. Chest - No distress. No increased work of breathing. Clear to auscultation in all fields, no wheezes, rales or rhonchi, symmetric air entry.    Heart - normal rate, regular rhythm, normal S1, S2, no murmurs, rubs  or gallops  Abdomen -soft, nontender, nondistended  Neurological - alert, oriented, grossly intact  Extremities - peripheral pulses normal, no pedal edema, no clubbing or cyanosis  Skin - warm and dry, no rashes, lesions, or wounds evident on exposed skin    Diagnostic Data:  I have reviewed recent diagnostic testing including labs    Lab Results   Component Value Date     12/23/2018    K 3.5 12/23/2018     12/23/2018    CO2 24 12/23/2018    BUN 8 12/23/2018    CREATININE 0.75 12/23/2018    GLUCOSE 101 12/23/2018    CALCIUM 9.3 12/23/2018      No results found for: LABA1C  No results found for: EAG  No results found for: CHOL  No results found for: TRIG  No results found for: HDL  No results found for: LDLCHOLESTEROL, LDLCALC  No results found for: LABVLDL, VLDL  No results found for: [facility]  Lab Results   Component Value Date    WBC 10.0 12/23/2018    HGB 13.5 12/23/2018 HCT 40.4 12/23/2018    MCV 90.2 12/23/2018     12/23/2018         Assessment/Plan:   Diagnosis Orders   1. Irregular menstrual cycle     2. Essential hypertension     3. Depression with anxiety         No orders of the defined types were placed in this encounter. 1) Irregular menses  Keep menstrual diary from now until you can see Dr. Emma Gerard  Reinforced that cyclical changes can happen. If heavy bleeding, cramping, clots, prolonged bleeding come back to be seen sooner. 2) HTN - take BP meds daily, call if this isn't improved. 3) Depression/anxiety - much improved. Keep regularly scheduled appt. Electronically signed by TO Stroud CNP 06/05/19 4:10 PM     [address]  [place], [address]     Phone number: [phone]  Fax number: [phone]

## 2019-06-17 NOTE — ED NOTES
Patient complains of no lightheadedness or dizziness at this time no headache at this time will monitor      Alfred Naylor RN  12/02/18 [id number]
[id number]

## 2019-07-08 ENCOUNTER — OFFICE VISIT (OUTPATIENT)
Dept: INTERNAL MEDICINE CLINIC | Age: 39
End: 2019-07-08
Payer: COMMERCIAL

## 2019-07-08 VITALS
RESPIRATION RATE: 16 BRPM | DIASTOLIC BLOOD PRESSURE: 76 MMHG | HEART RATE: 71 BPM | HEIGHT: 67 IN | WEIGHT: 239 LBS | SYSTOLIC BLOOD PRESSURE: 144 MMHG | BODY MASS INDEX: 37.51 KG/M2

## 2019-07-08 DIAGNOSIS — N64.4 BREAST PAIN, LEFT: ICD-10-CM

## 2019-07-08 DIAGNOSIS — F41.8 DEPRESSION WITH ANXIETY: ICD-10-CM

## 2019-07-08 DIAGNOSIS — I10 ESSENTIAL HYPERTENSION: Primary | ICD-10-CM

## 2019-07-08 PROCEDURE — 99213 OFFICE O/P EST LOW 20 MIN: CPT | Performed by: NURSE PRACTITIONER

## 2019-07-08 RX ORDER — HYDROXYZINE HCL 10 MG/5 ML
10 SOLUTION, ORAL ORAL 3 TIMES DAILY
COMMUNITY
End: 2019-07-08

## 2019-07-08 RX ORDER — HYDROXYZINE HYDROCHLORIDE 10 MG/1
10 TABLET, FILM COATED ORAL 3 TIMES DAILY PRN
COMMUNITY
End: 2019-07-14

## 2019-07-08 RX ORDER — LISINOPRIL 10 MG/1
10 TABLET ORAL DAILY
Qty: 30 TABLET | Refills: 5 | Status: SHIPPED | OUTPATIENT
Start: 2019-07-08 | End: 2019-11-11 | Stop reason: SDUPTHER

## 2019-07-08 ASSESSMENT — ENCOUNTER SYMPTOMS
NAUSEA: 0
SHORTNESS OF BREATH: 0
CONSTIPATION: 0
VOMITING: 0
COUGH: 0
WHEEZING: 0
ABDOMINAL PAIN: 0
DIARRHEA: 0

## 2019-07-08 NOTE — PATIENT INSTRUCTIONS
Schedule repeat mammogram to evaluate. Will send records to [facility] from [facility]. Continue Lisinopril.

## 2019-07-08 NOTE — PROGRESS NOTES
rales.   Abdominal: Soft. Bowel sounds are normal. She exhibits no distension. There is no tenderness. Musculoskeletal: Normal range of motion. She exhibits no edema, tenderness or deformity. Lymphadenopathy:     She has no cervical adenopathy. Neurological: She is alert. No cranial nerve deficit. Skin: Skin is warm and dry. Capillary refill takes less than 2 seconds. No rash noted. She is not diaphoretic. No erythema. No pallor. Psychiatric: She has a normal mood and affect. Her behavior is normal. Judgment and thought content normal.       Labs Reviewed 7/8/2019:    Lab Results   Component Value Date    WBC 10.0 12/23/2018    HGB 13.5 12/23/2018    HCT 40.4 12/23/2018     12/23/2018    ALT 13 12/04/2018    AST 17 12/04/2018     12/23/2018    K 3.5 (L) 12/23/2018     12/23/2018    CREATININE 0.75 12/23/2018    BUN 8 12/23/2018    CO2 24 12/23/2018    TSH 2.140 12/04/2018    INR 1.23 (H) 12/04/2018       Assessment/Plan      1. Essential hypertension  Continue lisinopril. Refill sent. Needs CMP, lipids. - lisinopril (PRINIVIL;ZESTRIL) 10 MG tablet; Take 1 tablet by mouth daily  Dispense: 30 tablet; Refill: 5    2. Depression with anxiety  Stable, use Hydroxyzine PRN. 3. Breast pain, left  [address] following, upcoming US to be repeated, order given for mammogram with US for densities thought to be fibroglandular. No follow-ups on file. Patient given educational materials - see patient instructions. Discussed use, benefit, and side effects of prescribed medications. All patient questions answered. Pt voiced understanding. Reviewed health maintenance.        Electronically signed TO Badillo CNP on 7/8/19 at 2:20 PM

## 2019-07-14 ENCOUNTER — HOSPITAL ENCOUNTER (EMERGENCY)
Age: 39
Discharge: HOME | End: 2019-07-14
Payer: COMMERCIAL

## 2019-07-14 VITALS
DIASTOLIC BLOOD PRESSURE: 69 MMHG | RESPIRATION RATE: 16 BRPM | TEMPERATURE: 97.5 F | HEART RATE: 72 BPM | BODY MASS INDEX: 36.01 KG/M2 | SYSTOLIC BLOOD PRESSURE: 145 MMHG | OXYGEN SATURATION: 100 % | WEIGHT: 230 LBS

## 2019-07-14 DIAGNOSIS — L72.0 EPIDERMOID CYST OF SKIN: Primary | ICD-10-CM

## 2019-07-14 PROCEDURE — 99213 OFFICE O/P EST LOW 20 MIN: CPT

## 2019-07-14 PROCEDURE — 99213 OFFICE O/P EST LOW 20 MIN: CPT | Performed by: NURSE PRACTITIONER

## 2019-07-14 RX ORDER — SULFAMETHOXAZOLE AND TRIMETHOPRIM 800; 160 MG/1; MG/1
1 TABLET ORAL 2 TIMES DAILY
Qty: 20 TABLET | Refills: 0 | Status: SHIPPED | OUTPATIENT
Start: 2019-07-14 | End: 2019-07-24

## 2019-07-14 ASSESSMENT — ENCOUNTER SYMPTOMS
TROUBLE SWALLOWING: 0
COLOR CHANGE: 1
EYE REDNESS: 0
EYE DISCHARGE: 0
EYE ITCHING: 0

## 2019-07-14 NOTE — ED NOTES
No change in patients condition. Discharge instructions and prescriptions discussed with pt. Pt. Verbalized understanding of info given and ambulated to exit in stable condition.       Tae Ward RN  07/14/19 0972

## 2019-07-16 ENCOUNTER — TELEPHONE (OUTPATIENT)
Dept: INTERNAL MEDICINE CLINIC | Age: 39
End: 2019-07-16

## 2019-08-08 ENCOUNTER — TELEPHONE (OUTPATIENT)
Dept: INTERNAL MEDICINE CLINIC | Age: 39
End: 2019-08-08

## 2019-08-08 DIAGNOSIS — F17.200 SMOKER: ICD-10-CM

## 2019-08-08 DIAGNOSIS — I10 ESSENTIAL HYPERTENSION: Primary | ICD-10-CM

## 2019-08-08 NOTE — TELEPHONE ENCOUNTER
Pt left a message on nurse line saying she has gotten a couple of phone calls from our office about a diagnostic mammogram.  She states she had mammogram on 8/1 at [facility]. From review of chart I see the results and also see her u/s of breast as well. Therefore I see no indication needs another mammogram.      I called pt back per her request and left a message on her vociemail that from what I see does not need a diagnostic mammogram as it was already done. If any questions,  Please call us back.

## 2019-09-27 ENCOUNTER — OFFICE VISIT (OUTPATIENT)
Dept: INTERNAL MEDICINE CLINIC | Age: 39
End: 2019-09-27
Payer: COMMERCIAL

## 2019-09-27 VITALS
SYSTOLIC BLOOD PRESSURE: 154 MMHG | WEIGHT: 232.5 LBS | DIASTOLIC BLOOD PRESSURE: 93 MMHG | HEIGHT: 67 IN | BODY MASS INDEX: 36.49 KG/M2 | RESPIRATION RATE: 20 BRPM | HEART RATE: 85 BPM

## 2019-09-27 DIAGNOSIS — N61.1 BOIL, BREAST: ICD-10-CM

## 2019-09-27 DIAGNOSIS — R00.2 PALPITATIONS: ICD-10-CM

## 2019-09-27 DIAGNOSIS — I10 ESSENTIAL HYPERTENSION: Primary | ICD-10-CM

## 2019-09-27 PROCEDURE — 4004F PT TOBACCO SCREEN RCVD TLK: CPT | Performed by: NURSE PRACTITIONER

## 2019-09-27 PROCEDURE — G8417 CALC BMI ABV UP PARAM F/U: HCPCS | Performed by: NURSE PRACTITIONER

## 2019-09-27 PROCEDURE — 93000 ELECTROCARDIOGRAM COMPLETE: CPT | Performed by: NURSE PRACTITIONER

## 2019-09-27 PROCEDURE — 99214 OFFICE O/P EST MOD 30 MIN: CPT | Performed by: NURSE PRACTITIONER

## 2019-09-27 PROCEDURE — G8427 DOCREV CUR MEDS BY ELIG CLIN: HCPCS | Performed by: NURSE PRACTITIONER

## 2019-09-27 RX ORDER — HYDROXYZINE HYDROCHLORIDE 10 MG/1
10 TABLET, FILM COATED ORAL PRN
COMMUNITY
End: 2019-11-11 | Stop reason: SDUPTHER

## 2019-09-27 NOTE — PROGRESS NOTES
[facility]  [address]  Dept: [phone]  Dept Fax: [phone]  Loc: [phone]     Visit Date:  2019    Patient:  Yane Godwin  YOB: 1980    HPI:     Chief Complaint   Patient presents with    Palpitations    Anxiety       States has palpitations frequently throughout the day, having increased anxiety since Monday due to issues with individuals at work and ending relationship. Has not used her Hydroxyzine. Not taking her BP med. BP today 154/93, recheck 148/90. Smoking a little more again. Has two lesions under left breast, improving with warm compresses, these have drained and are smaller now. Not red or warm. States she has had recurrent boils in different areas. Wishes to hold on antibiotics and will call if worsening with warmth, fever, redness, drainage. Wishes to see dermatology for further evaluation of this. Medications    Current Outpatient Medications:     hydrOXYzine (ATARAX) 10 MG tablet, Take 10 mg by mouth as needed for Anxiety, Disp: , Rfl:     lisinopril (PRINIVIL;ZESTRIL) 10 MG tablet, Take 1 tablet by mouth daily, Disp: 30 tablet, Rfl: 5    acetaminophen (TYLENOL) 500 MG tablet, Take 500 mg by mouth every 6 hours as needed for Pain, Disp: , Rfl:     etodolac (LODINE) 400 MG tablet, Take 1 tablet by mouth 2 times daily for 20 doses, Disp: 20 tablet, Rfl: 0    The patient has No Known Allergies. Past Medical History  [facility]  has a past medical history of Essential hypertension. Past Surgical History  The patient  has a past surgical history that includes Tubal ligation () and  section ().     Family History  This patient's family history includes Cancer in her paternal grandmother; Cancer (age of onset: 54) in her maternal grandfather; Diabetes (age of onset: 36) in her mother; Heart Attack in her paternal grandmother; Heart Attack (age of onset: 36) in her father; Heart Disease in her paternal grandmother; High Blood Pressure in her mother. Social History  Alex Nunez  reports that she has been smoking cigarettes. She has a 3.00 pack-year smoking history. She has never used smokeless tobacco. She reports that she drinks alcohol. She reports that she does not use drugs. Health Maintenance:    Health Maintenance   Topic Date Due    Pneumococcal 0-64 years Vaccine (1 of 1 - PPSV23) 10/23/1985    HIV screen  10/23/1994    Cervical cancer screen  03/01/2019    Flu vaccine (1) 09/01/2019    Potassium monitoring  12/23/2019    Creatinine monitoring  12/23/2019    DTaP/Tdap/Td vaccine (3 - Td) 07/05/2026       Subjective:      Review of Systems   Constitutional: Negative for chills, fatigue and fever. HENT: Negative for sore throat and trouble swallowing. Eyes: Negative for itching and visual disturbance. Respiratory: Negative for cough, choking and shortness of breath. Cardiovascular: Positive for palpitations. Negative for chest pain and leg swelling. Gastrointestinal: Negative for abdominal pain, constipation, diarrhea, nausea and vomiting. Endocrine: Negative for cold intolerance and heat intolerance. Genitourinary: Negative for difficulty urinating and dysuria. Musculoskeletal: Negative for arthralgias and myalgias. Skin: Negative for rash and wound. Neurological: Negative for dizziness, tremors, weakness, light-headedness, numbness and headaches. Psychiatric/Behavioral: Negative for agitation, dysphoric mood, sleep disturbance and suicidal ideas. The patient is not nervous/anxious. Objective:     BP (!) 154/93 (Site: Left Upper Arm, Position: Sitting, Cuff Size: Large Adult)   Pulse 85   Resp 20   Ht 5' 7.01\" (1.702 m)   Wt 232 lb 8 oz (105.5 kg)   BMI 36.41 kg/m²     Physical Exam   Constitutional: She is oriented to person, place, and time.  She appears well-developed and

## 2019-09-30 ASSESSMENT — ENCOUNTER SYMPTOMS
EYE ITCHING: 0
SORE THROAT: 0
CONSTIPATION: 0
CHOKING: 0
VOMITING: 0
TROUBLE SWALLOWING: 0
NAUSEA: 0
COUGH: 0
DIARRHEA: 0
ABDOMINAL PAIN: 0
SHORTNESS OF BREATH: 0

## 2019-11-06 ENCOUNTER — TELEPHONE (OUTPATIENT)
Dept: INTERNAL MEDICINE CLINIC | Age: 39
End: 2019-11-06

## 2019-11-11 ENCOUNTER — OFFICE VISIT (OUTPATIENT)
Dept: INTERNAL MEDICINE CLINIC | Age: 39
End: 2019-11-11
Payer: COMMERCIAL

## 2019-11-11 VITALS
SYSTOLIC BLOOD PRESSURE: 136 MMHG | DIASTOLIC BLOOD PRESSURE: 80 MMHG | HEIGHT: 67 IN | HEART RATE: 68 BPM | WEIGHT: 232 LBS | BODY MASS INDEX: 36.41 KG/M2

## 2019-11-11 DIAGNOSIS — F41.8 DEPRESSION WITH ANXIETY: Primary | ICD-10-CM

## 2019-11-11 DIAGNOSIS — I10 ESSENTIAL HYPERTENSION: ICD-10-CM

## 2019-11-11 PROCEDURE — G8417 CALC BMI ABV UP PARAM F/U: HCPCS | Performed by: NURSE PRACTITIONER

## 2019-11-11 PROCEDURE — 4004F PT TOBACCO SCREEN RCVD TLK: CPT | Performed by: NURSE PRACTITIONER

## 2019-11-11 PROCEDURE — 99214 OFFICE O/P EST MOD 30 MIN: CPT | Performed by: NURSE PRACTITIONER

## 2019-11-11 PROCEDURE — G8427 DOCREV CUR MEDS BY ELIG CLIN: HCPCS | Performed by: NURSE PRACTITIONER

## 2019-11-11 PROCEDURE — G8484 FLU IMMUNIZE NO ADMIN: HCPCS | Performed by: NURSE PRACTITIONER

## 2019-11-11 RX ORDER — CITALOPRAM 10 MG/1
10 TABLET ORAL DAILY
Qty: 30 TABLET | Refills: 3 | Status: SHIPPED | OUTPATIENT
Start: 2019-11-11 | End: 2020-06-16 | Stop reason: ALTCHOICE

## 2019-11-11 RX ORDER — HYDROXYZINE HYDROCHLORIDE 10 MG/1
10 TABLET, FILM COATED ORAL EVERY 8 HOURS PRN
Qty: 30 TABLET | Refills: 2 | Status: SHIPPED | OUTPATIENT
Start: 2019-11-11 | End: 2020-10-09

## 2019-11-11 RX ORDER — LISINOPRIL 10 MG/1
10 TABLET ORAL DAILY
Qty: 30 TABLET | Refills: 5 | Status: SHIPPED | OUTPATIENT
Start: 2019-11-11 | End: 2020-02-10 | Stop reason: DRUGHIGH

## 2019-11-24 ASSESSMENT — ENCOUNTER SYMPTOMS
ABDOMINAL PAIN: 0
DIARRHEA: 0
TROUBLE SWALLOWING: 0
CHOKING: 0
SHORTNESS OF BREATH: 0
SORE THROAT: 0
NAUSEA: 0
COUGH: 0
VOMITING: 0
CONSTIPATION: 0
EYE ITCHING: 0

## 2019-12-05 ENCOUNTER — OFFICE VISIT (OUTPATIENT)
Dept: INTERNAL MEDICINE CLINIC | Age: 39
End: 2019-12-05
Payer: COMMERCIAL

## 2019-12-05 VITALS
HEART RATE: 70 BPM | BODY MASS INDEX: 36.98 KG/M2 | WEIGHT: 235.6 LBS | DIASTOLIC BLOOD PRESSURE: 90 MMHG | SYSTOLIC BLOOD PRESSURE: 160 MMHG | HEIGHT: 67 IN

## 2019-12-05 DIAGNOSIS — I10 ESSENTIAL HYPERTENSION: ICD-10-CM

## 2019-12-05 DIAGNOSIS — F41.8 DEPRESSION WITH ANXIETY: Primary | ICD-10-CM

## 2019-12-05 PROCEDURE — 99213 OFFICE O/P EST LOW 20 MIN: CPT | Performed by: NURSE PRACTITIONER

## 2019-12-05 PROCEDURE — 4004F PT TOBACCO SCREEN RCVD TLK: CPT | Performed by: NURSE PRACTITIONER

## 2019-12-05 PROCEDURE — G8417 CALC BMI ABV UP PARAM F/U: HCPCS | Performed by: NURSE PRACTITIONER

## 2019-12-05 PROCEDURE — G8484 FLU IMMUNIZE NO ADMIN: HCPCS | Performed by: NURSE PRACTITIONER

## 2019-12-05 PROCEDURE — G8427 DOCREV CUR MEDS BY ELIG CLIN: HCPCS | Performed by: NURSE PRACTITIONER

## 2019-12-05 ASSESSMENT — ENCOUNTER SYMPTOMS
CONSTIPATION: 0
COUGH: 0
SORE THROAT: 0
EYE ITCHING: 0
SHORTNESS OF BREATH: 0
ABDOMINAL PAIN: 0
NAUSEA: 0
CHOKING: 0
TROUBLE SWALLOWING: 0
VOMITING: 0
DIARRHEA: 0

## 2019-12-09 ENCOUNTER — TELEPHONE (OUTPATIENT)
Dept: INTERNAL MEDICINE CLINIC | Age: 39
End: 2019-12-09

## 2020-01-06 ENCOUNTER — NURSE ONLY (OUTPATIENT)
Dept: LAB | Age: 40
End: 2020-01-06

## 2020-01-06 ENCOUNTER — OFFICE VISIT (OUTPATIENT)
Dept: INTERNAL MEDICINE CLINIC | Age: 40
End: 2020-01-06
Payer: COMMERCIAL

## 2020-01-06 VITALS
DIASTOLIC BLOOD PRESSURE: 90 MMHG | BODY MASS INDEX: 36.73 KG/M2 | HEART RATE: 67 BPM | WEIGHT: 234 LBS | HEIGHT: 67 IN | SYSTOLIC BLOOD PRESSURE: 140 MMHG

## 2020-01-06 LAB
ALBUMIN SERPL-MCNC: 4.4 G/DL (ref 3.5–5.1)
ALP BLD-CCNC: 80 U/L (ref 38–126)
ALT SERPL-CCNC: 16 U/L (ref 11–66)
ANION GAP SERPL CALCULATED.3IONS-SCNC: 12 MEQ/L (ref 8–16)
AST SERPL-CCNC: 16 U/L (ref 5–40)
BILIRUB SERPL-MCNC: 0.2 MG/DL (ref 0.3–1.2)
BUN BLDV-MCNC: 12 MG/DL (ref 7–22)
CALCIUM SERPL-MCNC: 9.3 MG/DL (ref 8.5–10.5)
CHLAMYDIA TRACHOMATIS BY RT-PCR: NOT DETECTED
CHLORIDE BLD-SCNC: 101 MEQ/L (ref 98–111)
CO2: 25 MEQ/L (ref 23–33)
CREAT SERPL-MCNC: 0.8 MG/DL (ref 0.4–1.2)
CT/NG SOURCE: NORMAL
ERYTHROCYTE [DISTWIDTH] IN BLOOD BY AUTOMATED COUNT: 13.5 % (ref 11.5–14.5)
ERYTHROCYTE [DISTWIDTH] IN BLOOD BY AUTOMATED COUNT: 45.3 FL (ref 35–45)
GFR SERPL CREATININE-BSD FRML MDRD: 79 ML/MIN/1.73M2
GLUCOSE BLD-MCNC: 81 MG/DL (ref 70–108)
HAV IGM SER IA-ACNC: NEGATIVE
HCT VFR BLD CALC: 36.9 % (ref 37–47)
HEMOGLOBIN: 12.1 GM/DL (ref 12–16)
HEPATITIS B CORE IGM ANTIBODY: NEGATIVE
HEPATITIS B SURFACE ANTIGEN: NEGATIVE
HEPATITIS C ANTIBODY: NEGATIVE
MCH RBC QN AUTO: 29.9 PG (ref 26–33)
MCHC RBC AUTO-ENTMCNC: 32.8 GM/DL (ref 32.2–35.5)
MCV RBC AUTO: 91.1 FL (ref 81–99)
NEISSERIA GONORRHOEAE BY RT-PCR: NOT DETECTED
PLATELET # BLD: 312 THOU/MM3 (ref 130–400)
PMV BLD AUTO: 11.5 FL (ref 9.4–12.4)
POTASSIUM SERPL-SCNC: 3.9 MEQ/L (ref 3.5–5.2)
RBC # BLD: 4.05 MILL/MM3 (ref 4.2–5.4)
SODIUM BLD-SCNC: 138 MEQ/L (ref 135–145)
TOTAL PROTEIN: 7.4 G/DL (ref 6.1–8)
WBC # BLD: 7.3 THOU/MM3 (ref 4.8–10.8)

## 2020-01-06 PROCEDURE — 99214 OFFICE O/P EST MOD 30 MIN: CPT | Performed by: NURSE PRACTITIONER

## 2020-01-06 PROCEDURE — 4004F PT TOBACCO SCREEN RCVD TLK: CPT | Performed by: NURSE PRACTITIONER

## 2020-01-06 PROCEDURE — G8417 CALC BMI ABV UP PARAM F/U: HCPCS | Performed by: NURSE PRACTITIONER

## 2020-01-06 PROCEDURE — G8427 DOCREV CUR MEDS BY ELIG CLIN: HCPCS | Performed by: NURSE PRACTITIONER

## 2020-01-06 PROCEDURE — G8484 FLU IMMUNIZE NO ADMIN: HCPCS | Performed by: NURSE PRACTITIONER

## 2020-01-06 RX ORDER — LORATADINE 10 MG/1
10 TABLET ORAL DAILY
Qty: 30 TABLET | Refills: 5 | Status: SHIPPED | OUTPATIENT
Start: 2020-01-06 | End: 2020-06-16 | Stop reason: ALTCHOICE

## 2020-01-06 ASSESSMENT — ENCOUNTER SYMPTOMS
ABDOMINAL PAIN: 0
VOMITING: 0
COUGH: 0
TROUBLE SWALLOWING: 0
DIARRHEA: 0
SORE THROAT: 0
CHOKING: 0
NAUSEA: 0
SHORTNESS OF BREATH: 0
EYE ITCHING: 0
CONSTIPATION: 0
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
## 2020-01-07 LAB — RPR: NONREACTIVE

## 2020-01-08 LAB — HIV-2 AB: NEGATIVE

## 2020-01-09 ENCOUNTER — TELEPHONE (OUTPATIENT)
Dept: INTERNAL MEDICINE CLINIC | Age: 40
End: 2020-01-09

## 2020-01-09 NOTE — TELEPHONE ENCOUNTER
----- Message from Glenda Severs, APRN - CNP sent at 1/9/2020  9:22 AM EST -----  Let her know the STD testing was all negative.

## 2020-02-10 ENCOUNTER — OFFICE VISIT (OUTPATIENT)
Dept: INTERNAL MEDICINE CLINIC | Age: 40
End: 2020-02-10
Payer: COMMERCIAL

## 2020-02-10 VITALS
DIASTOLIC BLOOD PRESSURE: 84 MMHG | SYSTOLIC BLOOD PRESSURE: 148 MMHG | HEART RATE: 80 BPM | WEIGHT: 235 LBS | BODY MASS INDEX: 36.88 KG/M2 | HEIGHT: 67 IN

## 2020-02-10 PROCEDURE — G8484 FLU IMMUNIZE NO ADMIN: HCPCS | Performed by: NURSE PRACTITIONER

## 2020-02-10 PROCEDURE — 99214 OFFICE O/P EST MOD 30 MIN: CPT | Performed by: NURSE PRACTITIONER

## 2020-02-10 PROCEDURE — 4004F PT TOBACCO SCREEN RCVD TLK: CPT | Performed by: NURSE PRACTITIONER

## 2020-02-10 PROCEDURE — G8427 DOCREV CUR MEDS BY ELIG CLIN: HCPCS | Performed by: NURSE PRACTITIONER

## 2020-02-10 PROCEDURE — G8417 CALC BMI ABV UP PARAM F/U: HCPCS | Performed by: NURSE PRACTITIONER

## 2020-02-10 RX ORDER — LISINOPRIL 20 MG/1
20 TABLET ORAL DAILY
Qty: 30 TABLET | Refills: 2 | Status: SHIPPED | OUTPATIENT
Start: 2020-02-10 | End: 2020-02-27 | Stop reason: DRUGHIGH

## 2020-02-10 ASSESSMENT — ENCOUNTER SYMPTOMS
PHOTOPHOBIA: 0
DIARRHEA: 0
WHEEZING: 0
VOMITING: 0
CONSTIPATION: 0
NAUSEA: 0
ABDOMINAL PAIN: 0
COUGH: 0
SHORTNESS OF BREATH: 0

## 2020-02-10 NOTE — PATIENT INSTRUCTIONS
Increase Lisinopril to 20 mg daily  Check BP daily if possible at home. Call if BP drops below 100 or if BP continues to be elevated despite increase in Lisinopril  Increase physical activity  Try to control stress and anxiety. Continue current medications    Get fasting labs right before visit in 2 weeks    Patient Education        DASH Diet: Care Instructions  Your Care Instructions    The DASH diet is an eating plan that can help lower your blood pressure. DASH stands for Dietary Approaches to Stop Hypertension. Hypertension is high blood pressure. The DASH diet focuses on eating foods that are high in calcium, potassium, and magnesium. These nutrients can lower blood pressure. The foods that are highest in these nutrients are fruits, vegetables, low-fat dairy products, nuts, seeds, and legumes. But taking calcium, potassium, and magnesium supplements instead of eating foods that are high in those nutrients does not have the same effect. The DASH diet also includes whole grains, fish, and poultry. The DASH diet is one of several lifestyle changes your doctor may recommend to lower your high blood pressure. Your doctor may also want you to decrease the amount of sodium in your diet. Lowering sodium while following the DASH diet can lower blood pressure even further than just the DASH diet alone. Follow-up care is a key part of your treatment and safety. Be sure to make and go to all appointments, and call your doctor if you are having problems. It's also a good idea to know your test results and keep a list of the medicines you take. How can you care for yourself at home? Following the DASH diet  · Eat 4 to 5 servings of fruit each day. A serving is 1 medium-sized piece of fruit, ½ cup chopped or canned fruit, 1/4 cup dried fruit, or 4 ounces (½ cup) of fruit juice. Choose fruit more often than fruit juice. · Eat 4 to 5 servings of vegetables each day.  A serving is 1 cup of lettuce or raw leafy vegetables, ½ squash, spinach, broccoli, carrots, cauliflower, and onions. ? Have a variety of cut-up vegetables with a low-fat dip as an appetizer instead of chips and dip. ? Sprinkle sunflower seeds or chopped almonds over salads. Or try adding chopped walnuts or almonds to cooked vegetables. ? Try some vegetarian meals using beans and peas. Add garbanzo or kidney beans to salads. Make burritos and tacos with mashed cummings beans or black beans. Where can you learn more? Go to https://[email or web address].[email or web address] org and sign in to your Zazum account. Enter H902 in the BookBottles box to learn more about \"DASH Diet: Care Instructions. \"     If you do not have an account, please click on the \"Sign Up Now\" link. Current as of: April 9, 2019  Content Version: 12.3  © 2878-5138 [employer] NEXT. Care instructions adapted under license by [facility] ([facility]). If you have questions about a medical condition or this instruction, always ask your healthcare professional. [place] [address] any warranty or liability for your use of this information. Patient Education        Learning About Mindfulness for Stress  What are mindfulness and stress? Stress is what you feel when you have to handle more than you are used to. A lot of things can cause stress. You may feel stress when you go on a job interview, take a test, or run a race. This kind of short-term stress is normal and even useful. It can help you if you need to work hard or react quickly. Stress also can last a long time. Long-term stress is caused by stressful situations or events. Examples of long-term stress include long-term health problems, ongoing problems at work, and conflicts in your family. Long-term stress can harm your health. Mindfulness is a focus only on things happening in the present moment.  It's a process of purposefully paying attention to and being aware of your surroundings, your emotions, your thoughts, and how your body feels. You are aware of these things, but you aren't judging these experiences as \"good\" or \"bad. \" Mindfulness can help you learn to calm your mind and body to help you cope with illness, pain, and stress. How does mindfulness help to relieve stress? Mindfulness can help quiet your mind and relax your body. Studies show that it can help some people sleep better, feel less anxious, and bring their blood pressure down. And it's been shown to help some people live and cope better with certain health problems like heart disease, depression, chronic pain, and cancer. How do you practice mindfulness? To be mindful is to pay attention, to be present, and to be accepting. · When you're mindful, you do just one thing and you pay close attention to that one thing. For example, you may sit quietly and notice your emotions or how your food tastes and smells. · When you're present, you focus on the things that are happening right now. You let go of your thoughts about the past and the future. When you dwell on the past or the future, you miss moments that can heal and strengthen you. You may miss moments like hearing a child laugh or seeing a friendly face when you think you're all alone. · When you're accepting, you don't  the present moment. Instead you accept your thoughts and feelings as they come. You can practice anytime, anywhere, and in any way you choose. You can practice in many ways. Here are a few ideas:  · While doing your chores, like washing the dishes, let your mind focus on what's in your hand. What does the dish feel like? Is the water warm or cold? · Go outside and take a few deep breaths. What is the air like? Is it warm or cold? · When you can, take some time at the start of your day to sit alone and think. · Take a slow walk by yourself. Count your steps while you breathe in and out. · Try yoga breathing exercises, stretches, and poses to strengthen and relax your muscles.   · At work, if you can, try to stop for a few moments each hour. Note how your body feels. Let yourself regroup and let your mind settle before you return to what you were doing. · If you struggle with anxiety or \"worry thoughts,\" imagine your mind as a blue connie and your worry thoughts as clouds. Now imagine those worry thoughts floating across your mind's connie. Just let them pass by as you watch. Follow-up care is a key part of your treatment and safety. Be sure to make and go to all appointments, and call your doctor if you are having problems. It's also a good idea to know your test results and keep a list of the medicines you take. Where can you learn more? Go to https://Smartmarket.Code71. org and sign in to your DoYouRemember account. Enter A007 in the ELIKE box to learn more about \"Learning About Mindfulness for Stress. \"     If you do not have an account, please click on the \"Sign Up Now\" link. Current as of: April 7, 2019  Content Version: 12.3  © 3483-6870 Healthwise, Incorporated. Care instructions adapted under license by [facility] ([facility]). If you have questions about a medical condition or this instruction, always ask your healthcare professional. [address] any warranty or liability for your use of this information.

## 2020-02-10 NOTE — PROGRESS NOTES
pack-year smoking history. She has never used smokeless tobacco. She reports current alcohol use. She reports that she does not use drugs. Health Maintenance:    Health Maintenance   Topic Date Due    Pneumococcal 0-64 years Vaccine (1 of 1 - PPSV23) 10/23/1985    Cervical cancer screen  03/01/2019    Flu vaccine (1) 09/01/2019    Lipid screen  10/23/2019    Potassium monitoring  01/06/2021    Creatinine monitoring  01/06/2021    DTaP/Tdap/Td vaccine (3 - Td) 07/05/2026    Shingles Vaccine (1 of 2) 10/23/2029    HIV screen  Completed    Hepatitis A vaccine  Aged Out    Hepatitis B vaccine  Aged Out    Hib vaccine  Aged Out    Meningococcal (ACWY) vaccine  Aged Out       Subjective:      Review of Systems   Constitutional: Negative for chills, fatigue and fever. Eyes: Negative for photophobia and visual disturbance. Respiratory: Negative for cough, shortness of breath and wheezing. Cardiovascular: Negative for chest pain, palpitations and leg swelling. Gastrointestinal: Negative for abdominal pain, constipation, diarrhea, nausea and vomiting. Genitourinary: Negative for difficulty urinating, dysuria and hematuria. Musculoskeletal: Negative for arthralgias and myalgias. Neurological: Negative for dizziness, tremors, weakness, light-headedness, numbness and headaches. Psychiatric/Behavioral: Negative for agitation, dysphoric mood, sleep disturbance and suicidal ideas. The patient is not nervous/anxious. Objective:     BP (!) 148/84   Pulse 80   Ht 5' 7.01\" (1.702 m)   Wt 235 lb (106.6 kg)   BMI 36.80 kg/m²     Physical Exam  Vitals signs reviewed. Constitutional:       Appearance: She is well-developed. She is not ill-appearing. HENT:      Head: Normocephalic. Eyes:      General: No scleral icterus. Right eye: No discharge. Left eye: No discharge. Pupils: Pupils are equal, round, and reactive to light.    Neck:      Musculoskeletal: Normal range of

## 2020-02-27 ENCOUNTER — OFFICE VISIT (OUTPATIENT)
Dept: INTERNAL MEDICINE CLINIC | Age: 40
End: 2020-02-27
Payer: COMMERCIAL

## 2020-02-27 VITALS
SYSTOLIC BLOOD PRESSURE: 94 MMHG | HEIGHT: 67 IN | WEIGHT: 228 LBS | TEMPERATURE: 99.3 F | BODY MASS INDEX: 35.79 KG/M2 | HEART RATE: 64 BPM | DIASTOLIC BLOOD PRESSURE: 60 MMHG

## 2020-02-27 PROCEDURE — 99214 OFFICE O/P EST MOD 30 MIN: CPT | Performed by: NURSE PRACTITIONER

## 2020-02-27 PROCEDURE — G8427 DOCREV CUR MEDS BY ELIG CLIN: HCPCS | Performed by: NURSE PRACTITIONER

## 2020-02-27 PROCEDURE — G8484 FLU IMMUNIZE NO ADMIN: HCPCS | Performed by: NURSE PRACTITIONER

## 2020-02-27 PROCEDURE — 4004F PT TOBACCO SCREEN RCVD TLK: CPT | Performed by: NURSE PRACTITIONER

## 2020-02-27 PROCEDURE — G8417 CALC BMI ABV UP PARAM F/U: HCPCS | Performed by: NURSE PRACTITIONER

## 2020-02-27 RX ORDER — LISINOPRIL 20 MG/1
10 TABLET ORAL DAILY
Qty: 30 TABLET | Refills: 2 | Status: SHIPPED | OUTPATIENT
Start: 2020-02-27 | End: 2020-06-16

## 2020-02-27 RX ORDER — ONDANSETRON 4 MG/1
4 TABLET, FILM COATED ORAL 3 TIMES DAILY PRN
Qty: 15 TABLET | Refills: 0 | Status: SHIPPED | OUTPATIENT
Start: 2020-02-27 | End: 2020-06-16

## 2020-02-27 RX ORDER — OSELTAMIVIR PHOSPHATE 75 MG/1
75 CAPSULE ORAL 2 TIMES DAILY
Qty: 10 CAPSULE | Refills: 0 | Status: SHIPPED | OUTPATIENT
Start: 2020-02-27 | End: 2020-03-03

## 2020-02-27 ASSESSMENT — ENCOUNTER SYMPTOMS
RHINORRHEA: 1
TROUBLE SWALLOWING: 0
NAUSEA: 1
COUGH: 0
WHEEZING: 0
VOMITING: 1
SINUS PAIN: 0
SINUS PRESSURE: 0
CHOKING: 0
SHORTNESS OF BREATH: 0
DIARRHEA: 0
SORE THROAT: 0
EYE DISCHARGE: 0
CONSTIPATION: 0
ABDOMINAL PAIN: 1

## 2020-02-27 NOTE — PROGRESS NOTES
disturbance. The patient is not nervous/anxious. Objective:     BP 94/60 (Site: Left Upper Arm, Position: Sitting, Cuff Size: Large Adult)   Pulse 64   Temp 99.3 °F (37.4 °C) (Oral)   Ht 5' 7\" (1.702 m)   Wt 228 lb (103.4 kg)   BMI 35.71 kg/m²     Physical Exam  Vitals signs reviewed. Constitutional:       Appearance: She is well-developed. She is ill-appearing. She is not diaphoretic. HENT:      Head: Normocephalic and atraumatic. Right Ear: Hearing, ear canal and external ear normal. Tympanic membrane is not erythematous, retracted or bulging. Left Ear: Hearing, ear canal and external ear normal. Tympanic membrane is not erythematous, retracted or bulging. Nose: Rhinorrhea present. No nasal tenderness. Rhinorrhea is clear. Right Turbinates: Not enlarged, swollen or pale. Left Turbinates: Not enlarged, swollen or pale. Mouth/Throat:      Mouth: Mucous membranes are moist.      Pharynx: Oropharynx is clear. Uvula midline. Posterior oropharyngeal erythema present. No pharyngeal swelling, oropharyngeal exudate or uvula swelling. Tonsils: No tonsillar exudate. Swellin on the right. 0 on the left. Eyes:      General: Lids are normal. No scleral icterus. Right eye: No discharge. Left eye: No discharge. Pupils: Pupils are equal, round, and reactive to light. Neck:      Musculoskeletal: Normal range of motion and neck supple. Thyroid: No thyroid mass, thyromegaly or thyroid tenderness. Cardiovascular:      Rate and Rhythm: Normal rate and regular rhythm. Pulses:           Carotid pulses are 2+ on the right side and 2+ on the left side. Posterior tibial pulses are 2+ on the right side and 2+ on the left side. Heart sounds: Normal heart sounds. No murmur. No friction rub. No gallop. Pulmonary:      Effort: Pulmonary effort is normal. No respiratory distress. Breath sounds: Normal breath sounds.  No decreased breath sounds, wheezing or rales. Abdominal:      General: Bowel sounds are normal. There is no distension. Palpations: Abdomen is soft. Tenderness: There is no abdominal tenderness. There is no guarding. Musculoskeletal: Normal range of motion. General: No tenderness or deformity. Right lower leg: No edema. Left lower leg: No edema. Lymphadenopathy:      Head:      Right side of head: Submandibular adenopathy present. No submental, tonsillar, preauricular, posterior auricular or occipital adenopathy. Left side of head: Submandibular adenopathy present. No submental, tonsillar, preauricular, posterior auricular or occipital adenopathy. Cervical: No cervical adenopathy. Skin:     General: Skin is warm and dry. Coloration: Skin is not pale. Findings: No erythema. Neurological:      Mental Status: She is alert and oriented to person, place, and time. GCS: GCS eye subscore is 4. GCS verbal subscore is 5. GCS motor subscore is 6. Cranial Nerves: No cranial nerve deficit. Gait: Gait is intact. Psychiatric:         Attention and Perception: Attention normal.         Behavior: Behavior is cooperative. Labs Reviewed 2/27/2020:    Lab Results   Component Value Date    WBC 7.3 01/06/2020    HGB 12.1 01/06/2020    HCT 36.9 (L) 01/06/2020     01/06/2020    ALT 16 01/06/2020    AST 16 01/06/2020     01/06/2020    K 3.9 01/06/2020     01/06/2020    CREATININE 0.8 01/06/2020    BUN 12 01/06/2020    CO2 25 01/06/2020    TSH 2.140 12/04/2018    INR 1.23 (H) 12/04/2018       Assessment/Plan      1. Essential hypertension  Please check your BPs at home when you get a blood pressure cuff. Can hold Lisinopril for the next day or two due to low intake. - lisinopril (PRINIVIL;ZESTRIL) 20 MG tablet; Take 0.5 tablets by mouth daily  Dispense: 30 tablet; Refill: 2    2.  Obesity (BMI 30-39.9)  1500 calories diet rich in non starchy vegetables, needed  Plenty of fluids, rest.   Tylenol for fever/body aches  Salt water gargles for sore throat  Saline nasal spray 2 sprays each nostril 2-3 times daily  Can try to drink Gatorade to try to rehydrate. Try BRAT diet, as this may be better tolerated: Bananas, Rice, Applesauce, and/or Toast     - oseltamivir (TAMIFLU) 75 MG capsule; Take 1 capsule by mouth 2 times daily for 5 days  Dispense: 10 capsule; Refill: 0    8. Other fatigue  Can use Mucinex DM for cough and to thin secretions if needed  Plenty of fluids, rest.   Tylenol for fever/body aches  Salt water gargles for sore throat  Saline nasal spray 2 sprays each nostril 2-3 times daily  Can try to drink Gatorade to try to rehydrate. Try BRAT diet, as this may be better tolerated: Bananas, Rice, Applesauce, and/or Toast     - oseltamivir (TAMIFLU) 75 MG capsule; Take 1 capsule by mouth 2 times daily for 5 days  Dispense: 10 capsule; Refill: 0      Return in about 1 month (around 3/27/2020). Patient given educational materials - see patient instructions. Discussed use, benefit, and side effects of prescribed medications. All patient questions answered. Pt voiced understanding. Reviewed health maintenance.        Electronically signed TO Meier CNP on 2/27/20 at 3:43 PM

## 2020-03-18 ENCOUNTER — TELEPHONE (OUTPATIENT)
Dept: INTERNAL MEDICINE CLINIC | Age: 40
End: 2020-03-18

## 2020-03-18 ENCOUNTER — OFFICE VISIT (OUTPATIENT)
Dept: INTERNAL MEDICINE CLINIC | Age: 40
End: 2020-03-18
Payer: COMMERCIAL

## 2020-03-18 VITALS
RESPIRATION RATE: 12 BRPM | HEART RATE: 91 BPM | BODY MASS INDEX: 36.41 KG/M2 | DIASTOLIC BLOOD PRESSURE: 85 MMHG | WEIGHT: 232 LBS | HEIGHT: 67 IN | SYSTOLIC BLOOD PRESSURE: 135 MMHG

## 2020-03-18 LAB
BILIRUBIN URINE: NEGATIVE
BLOOD, URINE: NEGATIVE
CHARACTER, URINE: CLEAR
COLOR: YELLOW
GLUCOSE URINE: NEGATIVE MG/DL
KETONES, URINE: ABNORMAL
LEUKOCYTE ESTERASE, URINE: NEGATIVE
NITRITE, URINE: NEGATIVE
PH UA: 5 (ref 5–9)
PROTEIN UA: NEGATIVE
SPECIFIC GRAVITY, URINE: 1.02 (ref 1–1.03)
UROBILINOGEN, URINE: 0.2 EU/DL (ref 0–1)

## 2020-03-18 PROCEDURE — G8484 FLU IMMUNIZE NO ADMIN: HCPCS | Performed by: NURSE PRACTITIONER

## 2020-03-18 PROCEDURE — G8417 CALC BMI ABV UP PARAM F/U: HCPCS | Performed by: NURSE PRACTITIONER

## 2020-03-18 PROCEDURE — 4004F PT TOBACCO SCREEN RCVD TLK: CPT | Performed by: NURSE PRACTITIONER

## 2020-03-18 PROCEDURE — 99214 OFFICE O/P EST MOD 30 MIN: CPT | Performed by: NURSE PRACTITIONER

## 2020-03-18 PROCEDURE — G8427 DOCREV CUR MEDS BY ELIG CLIN: HCPCS | Performed by: NURSE PRACTITIONER

## 2020-03-18 RX ORDER — PANTOPRAZOLE SODIUM 20 MG/1
20 TABLET, DELAYED RELEASE ORAL
Qty: 90 TABLET | Refills: 1 | Status: SHIPPED | OUTPATIENT
Start: 2020-03-18 | End: 2020-06-23 | Stop reason: ALTCHOICE

## 2020-03-18 ASSESSMENT — ENCOUNTER SYMPTOMS
EYE ITCHING: 0
SORE THROAT: 0
NAUSEA: 0
ABDOMINAL DISTENTION: 1
DIARRHEA: 0
COUGH: 0
SHORTNESS OF BREATH: 0
CHOKING: 0
TROUBLE SWALLOWING: 0
CONSTIPATION: 1
VOMITING: 0
ABDOMINAL PAIN: 1

## 2020-03-18 NOTE — TELEPHONE ENCOUNTER
----- Message from TO Castillo CNP sent at 3/18/2020  2:15 PM EDT -----  Urine looks ok, a little concentrated so needs to continue to drink more water. No infection.

## 2020-03-18 NOTE — PROGRESS NOTES
[facility]  [address] [facility] [address]  Dept: [phone]  Dept Fax: [phone]  Loc: [phone]     Visit Date:  3/18/2020    Patient:  Ashanti Pickett  YOB: 1980    HPI:     Chief Complaint   Patient presents with    Abdominal Pain    Lower Back Pain    Flank Pain       Here for abdominal pain, back pain, flank pain. She has been experiencing LUQ pain since Friday, explains as mild, this continued over the weekend. She increased her water the past couple of days and states it is fading a little. Her urine has cleared up, it was a little darker yellow. No nausea, vomiting, fever. She is having bloated, flatus, eructation. Her stool was a little firmer this am. Having gurgling sound in her left upper abdomen. This does radiate around the side to left flank at times. Had been unable to sleep on the left side, until last night. States that passing gas helps. She does report that at times it is difficult to pass her stool, and she feels a bulge in her vagina when she bears down. She sometimes has to place counter pressure on this to help encourage a BM when her stools are firm. No pain. HTN - She stopped her BP meds since our last visit. She was concerned as her BP was a little low, but she had been sick and looked mildly dehydrated. Going to [employer] twice weekly and states this has been 120-130/79-88. Today BP on recheck 135/85, was elevated on presentation, however she was having anxiety about coming in due to the current viral situation. Anxiety - States fairly well controlled, is able to calm herself down with deep breathing exercises.            Medications    Current Outpatient Medications:     pantoprazole (PROTONIX) 20 MG tablet, Take 1 tablet by mouth every morning (before breakfast), Disp: 90 tablet, Rfl: 1    lisinopril (PRINIVIL;ZESTRIL) 20 MG tablet, Take 0.5 tablets by mouth according to package instructions  - pantoprazole (PROTONIX) 20 MG tablet; Take 1 tablet by mouth every morning (before breakfast)  Dispense: 90 tablet; Refill: 1    4. Essential hypertension  Currently stable BP off meds. Will follow closely. Reschedule April appt to May. No follow-ups on file. Patient given educational materials - see patient instructions. Discussed use, benefit, and side effects of prescribed medications. All patient questions answered. Pt voiced understanding. Reviewed health maintenance.        Electronically signed TO Meier CNP on 3/18/20 at 9:51 AM EDT

## 2020-03-18 NOTE — PATIENT INSTRUCTIONS
Increase water intake. Plenty of fiber, can get a fiber gummie product and take per package instructions. Kegel exercises. Get urinalysis. Get over the counter simethicone (Gas-X), take according to package instructions      Patient Education        Rectocele: Care Instructions  Your Care Instructions    A rectocele occurs when the wall between the vagina and the rectum weakens. This causes the rectum to press against the vagina. Pregnancy or past surgery can damage muscles or other tissues in the pelvis. Also, pelvic muscles may weaken as you age. A rectocele may not cause symptoms. Or, you may notice a bulge in your vagina when you strain or bear down during a bowel movement. You may feel pressure, have pain during sex, or have trouble passing stool. A rectocele usually does not cause serious health problems. If your symptoms get worse, you may want to talk with your doctor about surgery to return the rectum to its normal position. Follow-up care is a key part of your treatment and safety. Be sure to make and go to all appointments, and call your doctor if you are having problems. It's also a good idea to know your test results and keep a list of the medicines you take. How can you care for yourself at home? · Avoid heavy lifting. It puts pressure on your pelvic muscles. · Do pelvic floor (Kegel) exercises, which tighten and strengthen pelvic muscles. To do Kegel exercises:  ? Squeeze the same muscles you would use to stop your urine. Your belly and thighs should not move. ? Hold the squeeze for 3 seconds, and then relax for 3 seconds. ? Start with 3 seconds. Then add 1 second each week until you are able to squeeze for 10 seconds. ? Repeat the exercise 10 to 15 times a session. Do three or more sessions a day. · Take an over-the-counter pain medicine, such as acetaminophen (Tylenol), ibuprofen (Advil, Motrin), or naproxen (Aleve). Read and follow all instructions on the label.   · Do not take

## 2020-05-16 ENCOUNTER — HOSPITAL ENCOUNTER (EMERGENCY)
Age: 40
Discharge: HOME | End: 2020-05-16
Payer: COMMERCIAL

## 2020-05-16 VITALS
DIASTOLIC BLOOD PRESSURE: 85 MMHG | TEMPERATURE: 98.5 F | RESPIRATION RATE: 16 BRPM | HEIGHT: 67 IN | WEIGHT: 221 LBS | SYSTOLIC BLOOD PRESSURE: 180 MMHG | BODY MASS INDEX: 34.69 KG/M2 | HEART RATE: 86 BPM

## 2020-05-16 LAB
EKG ATRIAL RATE: 67 BPM
EKG P AXIS: 55 DEGREES
EKG P-R INTERVAL: 130 MS
EKG Q-T INTERVAL: 406 MS
EKG QRS DURATION: 86 MS
EKG QTC CALCULATION (BAZETT): 429 MS
EKG R AXIS: 54 DEGREES
EKG T AXIS: 34 DEGREES
EKG VENTRICULAR RATE: 67 BPM

## 2020-05-16 PROCEDURE — 99213 OFFICE O/P EST LOW 20 MIN: CPT | Performed by: NURSE PRACTITIONER

## 2020-05-16 PROCEDURE — 93005 ELECTROCARDIOGRAM TRACING: CPT | Performed by: NURSE PRACTITIONER

## 2020-05-16 PROCEDURE — 99214 OFFICE O/P EST MOD 30 MIN: CPT

## 2020-05-16 ASSESSMENT — ENCOUNTER SYMPTOMS
COLOR CHANGE: 0
TROUBLE SWALLOWING: 0
SHORTNESS OF BREATH: 0
CHEST TIGHTNESS: 0

## 2020-05-16 NOTE — ED TRIAGE NOTES
Jelly Karishma arrives by self  to room with complaint of b/p machine reading arrythmia symptoms started 3 days ago.

## 2020-05-16 NOTE — ED PROVIDER NOTES
TRIAGE VITALS  BP: (!) 180/85(at home 120s over 80's), Temp: 98.5 °F (36.9 °C), Pulse: 86, Resp: 16,  ,Estimated body mass index is 34.61 kg/m² as calculated from the following:    Height as of this encounter: 5' 7\" (1.702 m). Weight as of this encounter: 221 lb (100.2 kg). ,No LMP recorded. Physical Exam  Vitals signs and nursing note reviewed. Constitutional:       General: She is in acute distress. Appearance: She is normal weight. She is not ill-appearing, toxic-appearing or diaphoretic. HENT:      Head: Normocephalic. Right Ear: External ear normal.      Left Ear: External ear normal.      Nose: Nose normal.   Eyes:      Pupils: Pupils are equal, round, and reactive to light. Neck:      Musculoskeletal: Normal range of motion. Cardiovascular:      Rate and Rhythm: Normal rate. Pulses: Normal pulses. Heart sounds: Normal heart sounds. No murmur. No friction rub. No gallop. Pulmonary:      Effort: Pulmonary effort is normal.      Breath sounds: Normal breath sounds. Musculoskeletal: Normal range of motion. Skin:     General: Skin is warm and dry. Capillary Refill: Capillary refill takes less than 2 seconds. Neurological:      General: No focal deficit present. Mental Status: She is alert and oriented to person, place, and time.    Psychiatric:         Mood and Affect: Mood normal.         Behavior: Behavior normal.         DIAGNOSTIC RESULTS     Labs:  Results for orders placed or performed during the hospital encounter of 05/16/20   EKG 12 Lead   Result Value Ref Range    Ventricular Rate 67 BPM    Atrial Rate 67 BPM    P-R Interval 130 ms    QRS Duration 86 ms    Q-T Interval 406 ms    QTc Calculation (Bazett) 429 ms    P Axis 55 degrees    R Axis 54 degrees    T Axis 34 degrees       IMAGING:    No orders to display         EKG:      URGENT CARE COURSE:     Vitals:    05/16/20 1300   BP: (!) 180/85   Pulse: 86   Resp: 16   Temp: 98.5 °F (36.9 °C)   TempSrc: Temporal   Weight: 221 lb (100.2 kg)   Height: 5' 7\" (1.702 m)       Medications - No data to display         PROCEDURES:  None    FINAL IMPRESSION      1. Palpitations          DISPOSITION/ PLAN     Patient was reassured that her EKG was normal and she is asymptomatic. She was advised to let her primary care provider know the first of the week so that she will be in the loop of communications. I did discuss with her that would be up to her primary care provider at that time if they wanted to continue to monitor this or do outpatient testing since she was asymptomatic. The patient is agreeable to the treatment plan and left ambulatory without any problems.       PATIENT REFERRED TO:  TO Klein CNP  [address] [place] / [facility] [address]      DISCHARGE MEDICATIONS:  Discharge Medication List as of 5/16/2020  1:31 PM          Discharge Medication List as of 5/16/2020  1:31 PM          Discharge Medication List as of 5/16/2020  1:31 PM          TO Chavez CNP    (Please note that portions of this note were completed with a voice recognition program. Efforts were made to edit the dictations but occasionally words are mis-transcribed.)           TO Chavez CNP  05/16/20 0918

## 2020-05-17 PROCEDURE — 93010 ELECTROCARDIOGRAM REPORT: CPT | Performed by: INTERNAL MEDICINE

## 2020-06-16 ENCOUNTER — OFFICE VISIT (OUTPATIENT)
Dept: INTERNAL MEDICINE CLINIC | Age: 40
End: 2020-06-16
Payer: COMMERCIAL

## 2020-06-16 ENCOUNTER — NURSE ONLY (OUTPATIENT)
Dept: LAB | Age: 40
End: 2020-06-16

## 2020-06-16 VITALS
RESPIRATION RATE: 14 BRPM | DIASTOLIC BLOOD PRESSURE: 78 MMHG | SYSTOLIC BLOOD PRESSURE: 138 MMHG | BODY MASS INDEX: 35.34 KG/M2 | HEART RATE: 73 BPM | TEMPERATURE: 97.7 F | WEIGHT: 233.2 LBS | HEIGHT: 68 IN

## 2020-06-16 LAB
ANION GAP SERPL CALCULATED.3IONS-SCNC: 7 MEQ/L (ref 8–16)
BUN BLDV-MCNC: 8 MG/DL (ref 7–22)
CALCIUM SERPL-MCNC: 9.1 MG/DL (ref 8.5–10.5)
CHLORIDE BLD-SCNC: 106 MEQ/L (ref 98–111)
CO2: 25 MEQ/L (ref 23–33)
CREAT SERPL-MCNC: 0.9 MG/DL (ref 0.4–1.2)
GLUCOSE BLD-MCNC: 110 MG/DL (ref 70–108)
POTASSIUM SERPL-SCNC: 3.3 MEQ/L (ref 3.5–5.2)
SODIUM BLD-SCNC: 138 MEQ/L (ref 135–145)

## 2020-06-16 PROCEDURE — G8417 CALC BMI ABV UP PARAM F/U: HCPCS | Performed by: NURSE PRACTITIONER

## 2020-06-16 PROCEDURE — 99214 OFFICE O/P EST MOD 30 MIN: CPT | Performed by: NURSE PRACTITIONER

## 2020-06-16 PROCEDURE — 4004F PT TOBACCO SCREEN RCVD TLK: CPT | Performed by: NURSE PRACTITIONER

## 2020-06-16 PROCEDURE — G8427 DOCREV CUR MEDS BY ELIG CLIN: HCPCS | Performed by: NURSE PRACTITIONER

## 2020-06-16 RX ORDER — LEVOCETIRIZINE DIHYDROCHLORIDE 5 MG/1
5 TABLET, FILM COATED ORAL NIGHTLY
Qty: 30 TABLET | Refills: 2 | Status: SHIPPED | OUTPATIENT
Start: 2020-06-16 | End: 2020-10-09

## 2020-06-16 ASSESSMENT — ENCOUNTER SYMPTOMS
CHOKING: 0
ABDOMINAL PAIN: 0
TROUBLE SWALLOWING: 0
NAUSEA: 0
VOMITING: 0
SHORTNESS OF BREATH: 0
DIARRHEA: 0
SORE THROAT: 0
COUGH: 0
EYE ITCHING: 0
CONSTIPATION: 0

## 2020-06-16 NOTE — PROGRESS NOTES
if > 140/80. Reduce sodium in diet, increase vegetables, increase water intake. - Basic Metabolic Panel; Future    2. Seasonal allergic rhinitis, unspecified trigger  D/C Claritin. - levocetirizine (XYZAL) 5 MG tablet; Take 1 tablet by mouth nightly  Dispense: 30 tablet; Refill: 2    3. Depression with anxiety  Continue Hydroxyzine PRN. Attached heart healthy guidelines    Return in about 6 months (around 12/16/2020). Patient given educational materials - see patient instructions. Discussed use, benefit, and side effects of prescribed medications. All patient questions answered. Pt voiced understanding. Reviewed health maintenance.        Electronically signed TO Bangura - CNP on 6/16/20 at 10:49 AM EDT

## 2020-06-16 NOTE — PATIENT INSTRUCTIONS
Notify me if BP higher than 140/80. Get blood work drawn next able. Patient Education        A Healthy Lifestyle: Care Instructions  Your Care Instructions     A healthy lifestyle can help you feel good, stay at a healthy weight, and have plenty of energy for both work and play. A healthy lifestyle is something you can share with your whole family. A healthy lifestyle also can lower your risk for serious health problems, such as high blood pressure, heart disease, and diabetes. You can follow a few steps listed below to improve your health and the health of your family. Follow-up care is a key part of your treatment and safety. Be sure to make and go to all appointments, and call your doctor if you are having problems. It's also a good idea to know your test results and keep a list of the medicines you take. How can you care for yourself at home? · Do not eat too much sugar, fat, or fast foods. You can still have dessert and treats now and then. The goal is moderation. · Start small to improve your eating habits. Pay attention to portion sizes, drink less juice and soda pop, and eat more fruits and vegetables. ? Eat a healthy amount of food. A 3-ounce serving of meat, for example, is about the size of a deck of cards. Fill the rest of your plate with vegetables and whole grains. ? Limit the amount of soda and sports drinks you have every day. Drink more water when you are thirsty. ? Eat at least 5 servings of fruits and vegetables every day. It may seem like a lot, but it is not hard to reach this goal. A serving or helping is 1 piece of fruit, 1 cup of vegetables, or 2 cups of leafy, raw vegetables. Have an apple or some carrot sticks as an afternoon snack instead of a candy bar. Try to have fruits and/or vegetables at every meal.  · Make exercise part of your daily routine. You may want to start with simple activities, such as walking, bicycling, or slow swimming.  Try to be active 30 to 60 foods with added sugar. These include candy, desserts, and soda pop. Limit alcohol  · Limit alcohol to no more than 2 drinks a day for men and 1 drink a day for women. Too much alcohol can cause health problems. When should you call for help? Watch closely for changes in your health, and be sure to contact your doctor if:  · You would like help planning heart-healthy meals. Where can you learn more? Go to https://Beijingyichengharshadeb.zerved. org and sign in to your [employer] account. Enter V137 in the Enomaly box to learn more about \"Heart-Healthy Diet: Care Instructions. \"     If you do not have an account, please click on the \"Sign Up Now\" link. Current as of: August 22, 2019               Content Version: 12.5  © 3358-1654 [employer]wise, [employer]orporated. Care instructions adapted under license by [facility] [address] If you have questions about a medical condition or this instruction, always ask your healthcare professional. [place] [address] any warranty or liability for your use of this information.

## 2020-06-17 ENCOUNTER — TELEPHONE (OUTPATIENT)
Dept: INTERNAL MEDICINE CLINIC | Age: 40
End: 2020-06-17

## 2020-06-17 NOTE — TELEPHONE ENCOUNTER
----- Message from TO Garcia CNP sent at 6/17/2020  8:34 AM EDT -----  Potassium is 3.3. Needs to increase her potassium intake, or can add a potassium supplement, whichever she would rather do, and recheck in two weeks. Kidney function looks good.

## 2020-06-23 ENCOUNTER — HOSPITAL ENCOUNTER (EMERGENCY)
Age: 40
Discharge: HOME | End: 2020-06-23
Payer: COMMERCIAL

## 2020-06-23 ENCOUNTER — APPOINTMENT (OUTPATIENT)
Dept: GENERAL RADIOLOGY | Age: 40
End: 2020-06-23
Payer: COMMERCIAL

## 2020-06-23 VITALS
OXYGEN SATURATION: 99 % | WEIGHT: 223 LBS | RESPIRATION RATE: 18 BRPM | HEART RATE: 79 BPM | TEMPERATURE: 98.9 F | DIASTOLIC BLOOD PRESSURE: 97 MMHG | BODY MASS INDEX: 35 KG/M2 | HEIGHT: 67 IN | SYSTOLIC BLOOD PRESSURE: 168 MMHG

## 2020-06-23 LAB
BILIRUBIN URINE: NEGATIVE
BLOOD, URINE: NORMAL
CHARACTER, URINE: CLEAR
COLOR: YELLOW
GFR SERPL CREATININE-BSD FRML MDRD: 84 ML/MIN/1.73M2
GLUCOSE URINE: NEGATIVE MG/DL
KETONES, URINE: NEGATIVE
LEUKOCYTE ESTERASE, URINE: NEGATIVE
NITRITE, URINE: NEGATIVE
PH UA: 7 (ref 5–9)
PROTEIN UA: NEGATIVE MG/DL
SPECIFIC GRAVITY UA: 1.02 (ref 1–1.03)
UROBILINOGEN, URINE: 0.2 EU/DL (ref 0.2–1)

## 2020-06-23 PROCEDURE — 81003 URINALYSIS AUTO W/O SCOPE: CPT

## 2020-06-23 PROCEDURE — 99215 OFFICE O/P EST HI 40 MIN: CPT

## 2020-06-23 PROCEDURE — 99213 OFFICE O/P EST LOW 20 MIN: CPT | Performed by: NURSE PRACTITIONER

## 2020-06-23 PROCEDURE — 74018 RADEX ABDOMEN 1 VIEW: CPT

## 2020-06-23 RX ORDER — SODIUM PHOSPHATE, DIBASIC AND SODIUM PHOSPHATE, MONOBASIC 7; 19 G/133ML; G/133ML
1 ENEMA RECTAL
Qty: 1 BOTTLE | Refills: 0 | Status: SHIPPED | OUTPATIENT
Start: 2020-06-23 | End: 2020-06-23

## 2020-06-23 ASSESSMENT — PAIN DESCRIPTION - ORIENTATION: ORIENTATION: LOWER;MID

## 2020-06-23 ASSESSMENT — ENCOUNTER SYMPTOMS
VOMITING: 0
BLOOD IN STOOL: 0
ABDOMINAL PAIN: 1
CONSTIPATION: 1
DIARRHEA: 0
NAUSEA: 0

## 2020-06-23 ASSESSMENT — PAIN SCALES - GENERAL: PAINLEVEL_OUTOF10: 7

## 2020-06-23 ASSESSMENT — PAIN DESCRIPTION - LOCATION: LOCATION: ABDOMEN

## 2020-06-23 NOTE — ED NOTES
Pt. Released in stable condition, ambulated per self to private car. Instructed pt to follow-up with family doctor as needed for recheck or go directly to the emergency department for any concerns/worsening conditions. Pt. Verbalized understanding of instructions. No questions at this time. RX in hand.       South Guerra RN  06/23/20 2702

## 2020-06-23 NOTE — ED PROVIDER NOTES
[facility] 35  Urgent Care Encounter       CHIEF COMPLAINT       Chief Complaint   Patient presents with    Abdominal Pain     on  while having a BM felt sharp low abd pain, not as bad yesterday off and on, abdominal pain since this morning, had soft stool this morning, hurts with bowel movements       Nurses Notes reviewed and I agree except as noted in the HPI. HISTORY OF PRESENT ILLNESS   Parag Sun is a 35 y.o. female who presents     Patient states that for the last 2 days she has had intermittent but worsening abdominal tenderness, more specifically with trying to have a bowel movement. She states that Monday symptoms did seem to improve, however she states that she did not have a bowel movement during that time. She denies use of any over-the-counter stool softeners or suppositories. She states that she did try to contact her primary care provider, and was told that there could be concern for possible hemorrhoids. She denies any known blood in stool. Patient also makes mention that she does still have her appendix and gallbladder. REVIEW OF SYSTEMS     Review of Systems   Constitutional: Negative for chills, fatigue and fever. Gastrointestinal: Positive for abdominal pain (generalized) and constipation. Negative for blood in stool, diarrhea, nausea and vomiting. Skin: Negative for rash. Neurological: Negative for dizziness, weakness, light-headedness and headaches. PAST MEDICAL HISTORY         Diagnosis Date    Depression with anxiety     Essential hypertension 2018       SURGICALHISTORY     Patient  has a past surgical history that includes Tubal ligation () and  section (/).     CURRENT MEDICATIONS       Discharge Medication List as of 2020  3:51 PM      CONTINUE these medications which have NOT CHANGED    Details   levocetirizine (XYZAL) 5 MG tablet Take 1 tablet by mouth nightly, Disp-30 tablet, R-2Normal present. Mental Status: She is alert and oriented to person, place, and time. Sensory: No sensory deficit. Psychiatric:         Mood and Affect: Mood normal.         Behavior: Behavior normal.         Thought Content: Thought content normal.         Judgment: Judgment normal.         DIAGNOSTIC RESULTS     Labs:  Results for orders placed or performed during the hospital encounter of 06/23/20   Urinalysis   Result Value Ref Range    Glucose, Ur Negative NEGATIVE mg/dl    Bilirubin Urine Negative NEGATIVE    Ketones, Urine Negative NEGATIVE    Specific Gravity, UA 1.025 1.002 - 1.03    Blood, Urine Trace-intact NEGATIVE    pH, UA 7.00 5.0 - 9.0    Protein, UA Negative NEGATIVE mg/dl    Urobilinogen, Urine 0.20 0.2 - 1.0 eu/dl    Nitrite, Urine Negative NEGATIVE    Leukocyte Esterase, Urine Negative NEGATIVE    Color, UA Yellow STRAW-YELL    Character, Urine Clear CLEAR-SL C       IMAGING:    XR ABDOMEN (KUB) (SINGLE AP VIEW)   Final Result   Constipation. **This report has been created using voice recognition software. It may contain minor errors which are inherent in voice recognition technology. **      Final report electronically signed by Dr. Chapo Foster on 6/23/2020 3:36 PM        URGENT CARE COURSE:     Vitals:    06/23/20 1509   BP: (!) 168/97   Pulse: 79   Resp: 18   Temp: 98.9 °F (37.2 °C)   TempSrc: Infrared   SpO2: 99%   Weight: 223 lb (101.2 kg)   Height: 5' 7\" (1.702 m)       Medications - No data to display         PROCEDURES:  None    FINAL IMPRESSION      1. Constipation, unspecified constipation type          DISPOSITION/ PLAN   Patient is discharged home with prescription for glycerin suppository as well as fleets enema. Discussed with patient that KUB does show constipation, UA was negative for any nitrates or leukocytes, will not be sent for urine culture.   Discussed with patient that if symptoms do not seem to be improving with prescriptions, should follow-up with primary care

## 2020-06-25 ENCOUNTER — APPOINTMENT (OUTPATIENT)
Dept: GENERAL RADIOLOGY | Age: 40
End: 2020-06-25
Payer: COMMERCIAL

## 2020-06-25 ENCOUNTER — HOSPITAL ENCOUNTER (EMERGENCY)
Age: 40
Discharge: HOME | End: 2020-06-25
Payer: COMMERCIAL

## 2020-06-25 VITALS
RESPIRATION RATE: 20 BRPM | BODY MASS INDEX: 36.57 KG/M2 | HEIGHT: 67 IN | OXYGEN SATURATION: 100 % | TEMPERATURE: 99.1 F | DIASTOLIC BLOOD PRESSURE: 86 MMHG | WEIGHT: 233 LBS | HEART RATE: 70 BPM | SYSTOLIC BLOOD PRESSURE: 133 MMHG

## 2020-06-25 LAB
ALBUMIN SERPL-MCNC: 4.1 G/DL (ref 3.5–5.1)
ALP BLD-CCNC: 63 U/L (ref 38–126)
ALT SERPL-CCNC: 11 U/L (ref 11–66)
ANION GAP SERPL CALCULATED.3IONS-SCNC: 11 MEQ/L (ref 8–16)
AST SERPL-CCNC: 12 U/L (ref 5–40)
BASOPHILS # BLD: 0.6 %
BASOPHILS ABSOLUTE: 0.1 THOU/MM3 (ref 0–0.1)
BILIRUB SERPL-MCNC: 0.3 MG/DL (ref 0.3–1.2)
BILIRUBIN URINE: NEGATIVE
BLOOD, URINE: NEGATIVE
BUN BLDV-MCNC: 11 MG/DL (ref 7–22)
CALCIUM SERPL-MCNC: 8.8 MG/DL (ref 8.5–10.5)
CHARACTER, URINE: CLEAR
CHLORIDE BLD-SCNC: 103 MEQ/L (ref 98–111)
CO2: 22 MEQ/L (ref 23–33)
COLOR: YELLOW
CREAT SERPL-MCNC: 0.9 MG/DL (ref 0.4–1.2)
EOSINOPHIL # BLD: 0.5 %
EOSINOPHILS ABSOLUTE: 0 THOU/MM3 (ref 0–0.4)
ERYTHROCYTE [DISTWIDTH] IN BLOOD BY AUTOMATED COUNT: 13.3 % (ref 11.5–14.5)
ERYTHROCYTE [DISTWIDTH] IN BLOOD BY AUTOMATED COUNT: 43.9 FL (ref 35–45)
GFR SERPL CREATININE-BSD FRML MDRD: 84 ML/MIN/1.73M2
GLUCOSE BLD-MCNC: 95 MG/DL (ref 70–108)
GLUCOSE URINE: NEGATIVE MG/DL
HCT VFR BLD CALC: 38.6 % (ref 37–47)
HEMOGLOBIN: 12.9 GM/DL (ref 12–16)
IMMATURE GRANS (ABS): 0.02 THOU/MM3 (ref 0–0.07)
IMMATURE GRANULOCYTES: 0.2 %
KETONES, URINE: ABNORMAL
LEUKOCYTE ESTERASE, URINE: NEGATIVE
LYMPHOCYTES # BLD: 31.3 %
LYMPHOCYTES ABSOLUTE: 3 THOU/MM3 (ref 1–4.8)
MCH RBC QN AUTO: 30.4 PG (ref 26–33)
MCHC RBC AUTO-ENTMCNC: 33.4 GM/DL (ref 32.2–35.5)
MCV RBC AUTO: 90.8 FL (ref 81–99)
MONOCYTES # BLD: 6.8 %
MONOCYTES ABSOLUTE: 0.7 THOU/MM3 (ref 0.4–1.3)
NITRITE, URINE: NEGATIVE
NUCLEATED RED BLOOD CELLS: 0 /100 WBC
OSMOLALITY CALCULATION: 271.2 MOSMOL/KG (ref 275–300)
PH UA: 5 (ref 5–9)
PLATELET # BLD: 288 THOU/MM3 (ref 130–400)
PMV BLD AUTO: 10.8 FL (ref 9.4–12.4)
POTASSIUM REFLEX MAGNESIUM: 3.7 MEQ/L (ref 3.5–5.2)
PROTEIN UA: NEGATIVE
RBC # BLD: 4.25 MILL/MM3 (ref 4.2–5.4)
SEG NEUTROPHILS: 60.6 %
SEGMENTED NEUTROPHILS ABSOLUTE COUNT: 5.8 THOU/MM3 (ref 1.8–7.7)
SODIUM BLD-SCNC: 136 MEQ/L (ref 135–145)
SPECIFIC GRAVITY, URINE: 1.03 (ref 1–1.03)
TOTAL PROTEIN: 7.3 G/DL (ref 6.1–8)
UROBILINOGEN, URINE: 1 EU/DL (ref 0–1)
WBC # BLD: 9.6 THOU/MM3 (ref 4.8–10.8)

## 2020-06-25 PROCEDURE — 74018 RADEX ABDOMEN 1 VIEW: CPT

## 2020-06-25 PROCEDURE — 36415 COLL VENOUS BLD VENIPUNCTURE: CPT

## 2020-06-25 PROCEDURE — 99284 EMERGENCY DEPT VISIT MOD MDM: CPT

## 2020-06-25 PROCEDURE — 81003 URINALYSIS AUTO W/O SCOPE: CPT

## 2020-06-25 PROCEDURE — 6370000000 HC RX 637 (ALT 250 FOR IP): Performed by: PHYSICIAN ASSISTANT

## 2020-06-25 PROCEDURE — 85025 COMPLETE CBC W/AUTO DIFF WBC: CPT

## 2020-06-25 PROCEDURE — 80053 COMPREHEN METABOLIC PANEL: CPT

## 2020-06-25 RX ORDER — LACTULOSE 10 G/10G
10 SOLUTION ORAL DAILY
Qty: 7 PACKET | Refills: 0 | Status: SHIPPED | OUTPATIENT
Start: 2020-06-25 | End: 2020-07-02

## 2020-06-25 RX ORDER — LACTULOSE 10 G/15ML
10 SOLUTION ORAL ONCE
Status: COMPLETED | OUTPATIENT
Start: 2020-06-25 | End: 2020-06-25

## 2020-06-25 RX ADMIN — LACTULOSE 10 G: 10 SOLUTION ORAL at 21:51

## 2020-06-25 ASSESSMENT — ENCOUNTER SYMPTOMS
DIARRHEA: 0
NAUSEA: 0
SHORTNESS OF BREATH: 0
BACK PAIN: 0
CONSTIPATION: 1
COUGH: 0
WHEEZING: 0
BLOOD IN STOOL: 0
VOMITING: 0
CHEST TIGHTNESS: 0
ABDOMINAL PAIN: 1

## 2020-06-25 ASSESSMENT — PAIN SCALES - GENERAL: PAINLEVEL_OUTOF10: 10

## 2020-06-25 ASSESSMENT — PAIN DESCRIPTION - PAIN TYPE: TYPE: ACUTE PAIN

## 2020-06-25 ASSESSMENT — PAIN DESCRIPTION - DESCRIPTORS: DESCRIPTORS: ACHING

## 2020-06-25 ASSESSMENT — PAIN DESCRIPTION - LOCATION: LOCATION: ABDOMEN

## 2020-06-26 ENCOUNTER — TELEPHONE (OUTPATIENT)
Dept: INTERNAL MEDICINE CLINIC | Age: 40
End: 2020-06-26

## 2020-06-26 NOTE — ED PROVIDER NOTES
range or not returned as of this dictation. Please note, any cultures that may have been sent were not resulted at the time of this patient visit. EMERGENCY DEPARTMENT COURSE andMedical Decision Making:     NAHEED/   This is a 79-year-old female who presented to the ED today with a chief complaint of generalized abdominal pain x4 days. On initial evaluation patient was in no acute distress however she was reporting migratory abdominal pain since she began taking her stool softener 2 days ago which was prescribed by urgent care. Patient was reporting regular BMs the day of beginning her stool softener however was concerned regarding her small BM reported earlier today around 1 PM.  Patient was denying any N/V, hematochezia, or melena. Appropriate labs and imaging obtained which were grossly unremarkable. KUB showed nonobstructive Bowel gas pattern with no significant stool retention. Due to patient's history, presentation, symptoms, labs, and imaging, do not believe patient has SBO, appendicitis, cholecystitis, or sepsis. Patient was initially provided with a dose of lactulose and observed in ED for 2 hours at which point patient reported a successful BM. Patient was very grateful and reported resolution of Sx. Recommend continued use of lactulose for next week or until Sx resolve. Discussed diagnostic findings with patient, nutrition recommendations for regular BMs, and recommendation for temporary continued use of lactulose for which patient demonstrated understanding and was in agreement with the plan of care. Educated on signs and symptoms to return to the ED such as returning abdominal pain, fever, nausea, vomiting, fever, or worsening constipation. We will discharge the patient home with PCP follow-up. Strict return precautions and follow up instructions were discussed with the patient with which the patient agrees.   Dr. Sandra De La Torre was present, consulted, and in agreement with the plan of care for

## 2020-07-28 ENCOUNTER — NURSE ONLY (OUTPATIENT)
Dept: LAB | Age: 40
End: 2020-07-28

## 2020-07-28 LAB — POTASSIUM SERPL-SCNC: 4.1 MEQ/L (ref 3.5–5.2)

## 2020-07-29 ENCOUNTER — TELEPHONE (OUTPATIENT)
Dept: INTERNAL MEDICINE CLINIC | Age: 40
End: 2020-07-29

## 2020-07-29 NOTE — TELEPHONE ENCOUNTER
----- Message from TO Navarrete CNP sent at 7/29/2020  8:08 AM EDT -----  Normal potassium, continue current care.

## 2020-08-27 ENCOUNTER — OFFICE VISIT (OUTPATIENT)
Dept: INTERNAL MEDICINE CLINIC | Age: 40
End: 2020-08-27
Payer: COMMERCIAL

## 2020-08-27 VITALS
DIASTOLIC BLOOD PRESSURE: 70 MMHG | WEIGHT: 231 LBS | HEART RATE: 65 BPM | HEIGHT: 69 IN | BODY MASS INDEX: 34.21 KG/M2 | TEMPERATURE: 97.9 F | SYSTOLIC BLOOD PRESSURE: 136 MMHG

## 2020-08-27 PROCEDURE — 99213 OFFICE O/P EST LOW 20 MIN: CPT | Performed by: NURSE PRACTITIONER

## 2020-08-27 PROCEDURE — G8427 DOCREV CUR MEDS BY ELIG CLIN: HCPCS | Performed by: NURSE PRACTITIONER

## 2020-08-27 PROCEDURE — 4004F PT TOBACCO SCREEN RCVD TLK: CPT | Performed by: NURSE PRACTITIONER

## 2020-08-27 PROCEDURE — G8417 CALC BMI ABV UP PARAM F/U: HCPCS | Performed by: NURSE PRACTITIONER

## 2020-08-27 RX ORDER — TRIAMCINOLONE ACETONIDE 55 UG/1
2 SPRAY, METERED NASAL DAILY
Qty: 1 INHALER | Refills: 3 | Status: SHIPPED | OUTPATIENT
Start: 2020-08-27 | End: 2020-10-09

## 2020-08-27 NOTE — PROGRESS NOTES
[address] [facility]  [address]  Dept: [phone]  Dept Fax: [phone] : [phone]     Visit Date:  2020    Patient:  Mitzi Belle  YOB: 1980    HPI:     Chief Complaint   Patient presents with    Nausea    Diarrhea       Went to work yesterday. Developed headache, stomachache within an hour, felt worse all evening. Feeling very fatigued. States she could only make half the parts as normal. Having diarrhea. States today she has continued headache, and stomachache after drinking soda. Took two tylenol. States she drinks a bottle an hour of water at work, has not been eating much as she doesn't have much of an appetite. States her allergies have been worsening, has not been taking Xyzal daily. /70. Has not been taking BP medication. DOes not check BP. Medications    Current Outpatient Medications:     triamcinolone (NASACORT) 55 MCG/ACT nasal inhaler, 2 sprays by Each Nostril route daily, Disp: 1 Inhaler, Rfl: 3    hydrOXYzine (ATARAX) 10 MG tablet, Take 1 tablet by mouth every 8 hours as needed for Anxiety, Disp: 30 tablet, Rfl: 2    acetaminophen (TYLENOL) 500 MG tablet, Take 500 mg by mouth every 6 hours as needed for Pain, Disp: , Rfl:     levocetirizine (XYZAL) 5 MG tablet, Take 1 tablet by mouth nightly (Patient not taking: Reported on 2020), Disp: 30 tablet, Rfl: 2    The patient has No Known Allergies. Past Medical History  Brady Young  has a past medical history of Depression with anxiety and Essential hypertension. Past Surgical History  The patient  has a past surgical history that includes Tubal ligation () and  section ().     Family History  This patient's family history includes Cancer in her paternal grandmother; Cancer (age of onset: 54) in her maternal grandfather; Diabetes (age of onset: 36) in her canal normal.      Left Ear: Tympanic membrane and ear canal normal.      Nose: Congestion (Boggy pale nasal turbinates) present. Mouth/Throat:      Mouth: Mucous membranes are moist.      Pharynx: Oropharynx is clear. No oropharyngeal exudate or posterior oropharyngeal erythema. Eyes:      General:         Right eye: No discharge. Left eye: No discharge. Neck:      Musculoskeletal: Neck supple. Thyroid: No thyromegaly. Cardiovascular:      Rate and Rhythm: Normal rate and regular rhythm. Heart sounds: Normal heart sounds. No murmur. No friction rub. No gallop. Pulmonary:      Effort: Pulmonary effort is normal. No respiratory distress. Breath sounds: Normal breath sounds. No wheezing or rales. Abdominal:      General: There is no distension. Palpations: Abdomen is soft. Tenderness: There is no abdominal tenderness. Lymphadenopathy:      Cervical: No cervical adenopathy. Skin:     General: Skin is warm and dry. Coloration: Skin is not pale. Findings: No erythema. Neurological:      Mental Status: She is alert and oriented to person, place, and time. Cranial Nerves: No cranial nerve deficit. Labs Reviewed 8/27/2020:    Lab Results   Component Value Date    WBC 9.6 06/25/2020    HGB 12.9 06/25/2020    HCT 38.6 06/25/2020     06/25/2020    ALT 11 06/25/2020    AST 12 06/25/2020     06/25/2020    K 4.1 07/28/2020     06/25/2020    CREATININE 0.9 06/25/2020    BUN 11 06/25/2020    CO2 22 (L) 06/25/2020    TSH 2.140 12/04/2018    INR 1.23 (H) 12/04/2018       Assessment/Plan      1. Essential hypertension  Monitor lytes, renal function  - Basic Metabolic Panel; Future  - Magnesium; Future    2. Seasonal allergic rhinitis, unspecified trigger  Continue Xyzal daily. Use Nasacort one spray each nostril daily.      Educated on use of nasal corticosteroids.   - [facility]  - triamcinolone (NASACORT) 54 MCG/ACT nasal inhaler; 2 sprays by Each Nostril route daily  Dispense: 1 Inhaler; Refill: 3    3. Fatigue, unspecified type  S/P 24 hours nausea believe overheating vs viral in nature and possibly allergic related also  Increase fluids, rest, and try bland foods such as chicken noodle soup or broth when your belly is bothering you. If worsening, get labs and urinalysis    Tylenol for headache.  - Basic Metabolic Panel; Future  - CBC With Auto Differential; Future  - TSH With Reflex Ft4; Future  - Magnesium; Future  - Urine Rt Reflex To Culture      Return in about 3 months (around 11/27/2020). Patient given educational materials - see patient instructions. Discussed use, benefit, and side effects of prescribed medications. All patient questions answered. Pt voiced understanding.        Electronically signed TO Marin CNP on 8/27/20 at 3:03 PM EDT

## 2020-08-27 NOTE — PATIENT INSTRUCTIONS
Increase fluids, rest, and try bland foods such as chicken noodle soup or broth when your belly is bothering you. If worsening, get labs and urinalysis    Tylenol for headache. Continue Xyzal daily. Use Nasacort one spray each nostril daily.

## 2020-09-07 ASSESSMENT — ENCOUNTER SYMPTOMS
VOMITING: 0
DIARRHEA: 0
SHORTNESS OF BREATH: 0
CONSTIPATION: 0
ABDOMINAL PAIN: 0
NAUSEA: 0
COUGH: 0

## 2020-09-08 ENCOUNTER — HOSPITAL ENCOUNTER (EMERGENCY)
Age: 40
Discharge: HOME | End: 2020-09-08
Payer: COMMERCIAL

## 2020-09-08 VITALS
RESPIRATION RATE: 18 BRPM | TEMPERATURE: 97.3 F | SYSTOLIC BLOOD PRESSURE: 164 MMHG | BODY MASS INDEX: 33.52 KG/M2 | DIASTOLIC BLOOD PRESSURE: 74 MMHG | WEIGHT: 227 LBS | HEART RATE: 59 BPM

## 2020-09-08 PROCEDURE — 99213 OFFICE O/P EST LOW 20 MIN: CPT

## 2020-09-08 PROCEDURE — 99213 OFFICE O/P EST LOW 20 MIN: CPT | Performed by: NURSE PRACTITIONER

## 2020-09-08 RX ORDER — ERYTHROMYCIN 5 MG/G
OINTMENT OPHTHALMIC 2 TIMES DAILY
Qty: 1 TUBE | Refills: 0 | Status: SHIPPED | OUTPATIENT
Start: 2020-09-08 | End: 2020-09-15

## 2020-09-08 ASSESSMENT — ENCOUNTER SYMPTOMS
COUGH: 0
NAUSEA: 0
SINUS PAIN: 1
CHOKING: 0
EYE ITCHING: 0
BLIND SPOTS: 0
EYE WATERING: 0
PHOTOPHOBIA: 1
EYE PAIN: 0
SINUS PRESSURE: 1
EYE DISCHARGE: 1
WHEEZING: 0
VOMITING: 0
CHEST TIGHTNESS: 0
RHINORRHEA: 1
DOUBLE VISION: 0
EYE REDNESS: 0
SHORTNESS OF BREATH: 0
CRUSTING: 1
PERI-ORBITAL EDEMA: 1
BLURRED VISION: 0
APNEA: 0
EYE INFLAMMATION: 0
STRIDOR: 0

## 2020-09-08 ASSESSMENT — PAIN DESCRIPTION - ORIENTATION: ORIENTATION: LEFT

## 2020-09-08 ASSESSMENT — PAIN DESCRIPTION - PAIN TYPE: TYPE: ACUTE PAIN

## 2020-09-08 ASSESSMENT — PAIN SCALES - GENERAL: PAINLEVEL_OUTOF10: 7

## 2020-09-08 ASSESSMENT — VISUAL ACUITY: OU: 1

## 2020-09-08 ASSESSMENT — PAIN DESCRIPTION - LOCATION: LOCATION: EYE

## 2020-09-08 NOTE — ED PROVIDER NOTES
[facility] 36  Urgent Care Encounter      CHIEF COMPLAINT       Chief Complaint   Patient presents with    Eye Problem     Has pain around left eye x's 1 week. Nurses Notes reviewed and I agree except as noted in the HPI. HISTORY OFPRESENT ILLNESS   Ileana Whaley is a 35 y.o. The history is provided by the patient. No  was used. Eye Problem   Location:  Left eye  Quality:  Tearing and stinging  Severity:  Mild  Onset quality:  Gradual  Duration:  1 week  Timing:  Intermittent  Progression:  Waxing and waning  Chronicity:  New  Context: not burn, not chemical exposure, not contact lens problem, not direct trauma, not foreign body, not using machinery, not scratch, not smoke exposure and not UV exposure    Relieved by:  Nothing  Worsened by:  Nothing  Ineffective treatments: tylenol. Associated symptoms: crusting, discharge, photophobia and swelling    Associated symptoms: no blurred vision, no decreased vision, no double vision, no facial rash, no headaches, no inflammation, no itching, no nausea, no numbness, no redness, no scotomas, no tearing, no tingling, no vomiting and no weakness    Risk factors: no conjunctival hemorrhage, no exposure to pinkeye, no previous injury to eye, no recent herpes zoster and no recent URI        REVIEW OF SYSTEMS     Review of Systems   Constitutional: Negative for activity change, appetite change, chills, diaphoresis, fatigue, fever and unexpected weight change. HENT: Positive for congestion, rhinorrhea, sinus pressure and sinus pain. Eyes: Positive for photophobia and discharge. Negative for blurred vision, double vision, pain, redness, itching and visual disturbance. Respiratory: Negative for apnea, cough, choking, chest tightness, shortness of breath, wheezing and stridor. Cardiovascular: Negative for chest pain, palpitations and leg swelling. Gastrointestinal: Negative for nausea and vomiting.    Neurological: Negative for dizziness, tingling, weakness, numbness and headaches. PAST MEDICAL HISTORY         Diagnosis Date    Depression with anxiety     Essential hypertension 2018       SURGICAL HISTORY     Patient  has a past surgical history that includes Tubal ligation () and  section (). CURRENT MEDICATIONS       Discharge Medication List as of 2020  6:37 PM      CONTINUE these medications which have NOT CHANGED    Details   triamcinolone (NASACORT) 55 MCG/ACT nasal inhaler 2 sprays by Each Nostril route daily, Disp-1 Inhaler,R-3Normal      levocetirizine (XYZAL) 5 MG tablet Take 1 tablet by mouth nightly, Disp-30 tablet, R-2Normal      hydrOXYzine (ATARAX) 10 MG tablet Take 1 tablet by mouth every 8 hours as needed for Anxiety, Disp-30 tablet, R-2Normal      acetaminophen (TYLENOL) 500 MG tablet Take 500 mg by mouth every 6 hours as needed for PainHistorical Med             ALLERGIES     Patient is has No Known Allergies. FAMILY HISTORY     Patient's family history includes Cancer in her paternal grandmother; Cancer (age of onset: 54) in her maternal grandfather; Diabetes (age of onset: 36) in her mother; Heart Attack in her paternal grandmother; Heart Attack (age of onset: 36) in her father; Heart Disease in her paternal grandmother; High Blood Pressure in her mother. SOCIAL HISTORY     Patient  reports that she has been smoking cigarettes. She has a 3.00 pack-year smoking history. She has never used smokeless tobacco. She reports current alcohol use. She reports that she does not use drugs. PHYSICAL EXAM     ED TRIAGE VITALS  BP: (!) 164/74, Temp: 97.3 °F (36.3 °C), Pulse: 59, Resp: 18,    Physical Exam  Vitals signs and nursing note reviewed. Constitutional:       General: She is awake. She is not in acute distress. Appearance: Normal appearance. She is obese. She is not ill-appearing, toxic-appearing or diaphoretic.    HENT:      Head: Normocephalic and atraumatic. Right Ear: External ear normal.      Left Ear: External ear normal.      Nose: Congestion present. Left Sinus: Maxillary sinus tenderness and frontal sinus tenderness present. Eyes:      General: Lids are everted, no foreign bodies appreciated. Vision grossly intact. Gaze aligned appropriately. Left eye: No foreign body, discharge or hordeolum. Extraocular Movements: Extraocular movements intact. Conjunctiva/sclera: Conjunctivae normal.      Left eye: Exudate present. Neck:      Musculoskeletal: Normal range of motion. No muscular tenderness. Vascular: No carotid bruit. Pulmonary:      Effort: Pulmonary effort is normal.      Breath sounds: Normal breath sounds. Musculoskeletal: Normal range of motion. Skin:     General: Skin is warm. Neurological:      General: No focal deficit present. Mental Status: She is alert and oriented to person, place, and time. Psychiatric:         Mood and Affect: Mood normal.         Behavior: Behavior normal. Behavior is cooperative. Thought Content: Thought content normal.         Judgment: Judgment normal.         DIAGNOSTIC RESULTS   Labs:No results found for this visit on 09/08/20. IMAGING:  No orders to display     URGENT CARE COURSE:     Vitals:    09/08/20 1812   BP: (!) 164/74   Pulse: 59   Resp: 18   Temp: 97.3 °F (36.3 °C)   TempSrc: Temporal   Weight: 227 lb (103 kg)       Medications - No data to display  PROCEDURES:  None  FINAL IMPRESSION      1.  Blepharitis of left upper eyelid, unspecified type        DISPOSITION/PLAN   Decision To Discharge    Wash hands good  Wipe eyes from nose to ear  Monitor for any increase in redness, pain or drainage  Monitor any visual changes  No Contacts x 1 week if patient wear contacts  Follow up with PCP x 48 - 72 hours if no better     PATIENT REFERRED TO:  Gisel Kaur, TO - CNP  [address] [place] [id number]    Call   As

## 2020-09-08 NOTE — ED NOTES
Pt. Released in stable condition, ambulated per self to private car. Instructed pt to follow-up with family doctor as needed for recheck or go directly to the emergency department for any concerns/worsening conditions. Pt. Verbalized understanding of instructions. No questions at this time. RX in hand.       Leeanne Burdick RN  09/08/20 8520

## 2020-10-09 ENCOUNTER — HOSPITAL ENCOUNTER (EMERGENCY)
Age: 40
Discharge: HOME | End: 2020-10-09
Payer: COMMERCIAL

## 2020-10-09 VITALS
TEMPERATURE: 98 F | HEIGHT: 69 IN | BODY MASS INDEX: 31.25 KG/M2 | WEIGHT: 211 LBS | OXYGEN SATURATION: 100 % | HEART RATE: 76 BPM | SYSTOLIC BLOOD PRESSURE: 128 MMHG | DIASTOLIC BLOOD PRESSURE: 68 MMHG | RESPIRATION RATE: 16 BRPM

## 2020-10-09 PROCEDURE — 99213 OFFICE O/P EST LOW 20 MIN: CPT

## 2020-10-09 PROCEDURE — U0003 INFECTIOUS AGENT DETECTION BY NUCLEIC ACID (DNA OR RNA); SEVERE ACUTE RESPIRATORY SYNDROME CORONAVIRUS 2 (SARS-COV-2) (CORONAVIRUS DISEASE [COVID-19]), AMPLIFIED PROBE TECHNIQUE, MAKING USE OF HIGH THROUGHPUT TECHNOLOGIES AS DESCRIBED BY CMS-2020-01-R: HCPCS

## 2020-10-09 PROCEDURE — 99213 OFFICE O/P EST LOW 20 MIN: CPT | Performed by: NURSE PRACTITIONER

## 2020-10-09 ASSESSMENT — ENCOUNTER SYMPTOMS
EYE REDNESS: 0
EYE ITCHING: 0
SINUS PRESSURE: 0
ABDOMINAL PAIN: 0
WHEEZING: 0
EYE DISCHARGE: 0
RHINORRHEA: 0
DIARRHEA: 0
VOMITING: 0
CHEST TIGHTNESS: 0
SHORTNESS OF BREATH: 0
STRIDOR: 0
SINUS PAIN: 0
COUGH: 0
SORE THROAT: 0
NAUSEA: 0
EYE PAIN: 0

## 2020-10-09 NOTE — ED PROVIDER NOTES
[facility] Encounter       CHIEF COMPLAINT       Chief Complaint   Patient presents with    Covid Testing       Nurses Notes reviewed and I agree except as noted in the HPI. HISTORY OF PRESENT ILLNESS   eCcilia Farrell is a 35 y.o. female who presents     Patient is present in the urgent care today with request to be tested for COVID-19, she states that her daughters were possibly exposed in their school, and they both live with her therefore she feels she may also may have been exposed. She denies having any symptoms herself. She denies any comorbidities. Denies any recent fevers. REVIEW OF SYSTEMS     Review of Systems   Constitutional: Negative for chills, fatigue and fever. HENT: Negative for congestion, postnasal drip, rhinorrhea, sinus pressure, sinus pain and sore throat. Eyes: Negative for pain, discharge, redness and itching. Respiratory: Negative for cough, chest tightness, shortness of breath, wheezing and stridor. Cardiovascular: Negative for chest pain. Gastrointestinal: Negative for abdominal pain, diarrhea, nausea and vomiting. Musculoskeletal: Negative for myalgias. Skin: Negative for rash. Neurological: Negative for dizziness, weakness, light-headedness, numbness and headaches. PAST MEDICAL HISTORY         Diagnosis Date    Depression with anxiety     Essential hypertension 2018       SURGICALHISTORY     Patient  has a past surgical history that includes Tubal ligation () and  section (). CURRENT MEDICATIONS       Discharge Medication List as of 10/9/2020  6:03 PM          ALLERGIES     Patient is has No Known Allergies.     Patients   Immunization History   Administered Date(s) Administered    Td, unspecified formulation 2016       FAMILY HISTORY     Patient's family history includes Cancer in her paternal grandmother; Cancer (age of onset: 54) in her maternal grandfather; Diabetes (age of onset: 36) in her mother; Heart Attack in her paternal grandmother; Heart Attack (age of onset: 36) in her father; Heart Disease in her paternal grandmother; High Blood Pressure in her mother. SOCIAL HISTORY     Patient  reports that she has been smoking cigarettes. She has a 3.00 pack-year smoking history. She has never used smokeless tobacco. She reports current alcohol use. She reports that she does not use drugs. PHYSICAL EXAM     ED TRIAGE VITALS  BP: 128/68, Temp: 98 °F (36.7 °C), Pulse: 76, Resp: 16, SpO2: 100 %,Estimated body mass index is 31.16 kg/m² as calculated from the following:    Height as of this encounter: 5' 9\" (1.753 m). Weight as of this encounter: 211 lb (95.7 kg). ,Patient's last menstrual period was 10/01/2020. Physical Exam  Constitutional:       General: She is not in acute distress. Appearance: Normal appearance. She is not ill-appearing, toxic-appearing or diaphoretic. HENT:      Nose: Nose normal.      Mouth/Throat:      Mouth: Mucous membranes are moist.   Pulmonary:      Effort: Pulmonary effort is normal. No respiratory distress. Musculoskeletal: Normal range of motion. Skin:     General: Skin is warm. Neurological:      General: No focal deficit present. Mental Status: She is alert and oriented to person, place, and time. Sensory: No sensory deficit. Psychiatric:         Mood and Affect: Mood normal.         Behavior: Behavior normal.         Thought Content: Thought content normal.         Judgment: Judgment normal.         DIAGNOSTIC RESULTS     Labs:No results found for this visit on 10/09/20.     IMAGING:    No orders to display     URGENT CARE COURSE:     Vitals:    10/09/20 1738   BP: 128/68   Pulse: 76   Resp: 16   Temp: 98 °F (36.7 °C)   SpO2: 100%   Weight: 211 lb (95.7 kg)   Height: 5' 9\" (1.753 m)       Medications - No data to display         PROCEDURES:  None    FINAL IMPRESSION      1. COVID-19 ruled out by laboratory testing DISPOSITION/ PLAN   Patient is discharged home with instructions to self quarantine until COVID-19 test have been resulted which can take 3 to 5 days. Patient is informed that if symptoms should arise, she should try to manage them at home, if they are unable to be managed at home she should go directly to the ER. Follow-up with primary care provider or health department if there is a positive result.         PATIENT REFERRED TO:  TO Patel CNP  [address] [place] [address] / [facility] [address]      DISCHARGE MEDICATIONS:  Discharge Medication List as of 10/9/2020  6:03 PM          Discharge Medication List as of 10/9/2020  6:03 PM      STOP taking these medications       triamcinolone (NASACORT) 55 MCG/ACT nasal inhaler Comments:   Reason for Stopping:         levocetirizine (XYZAL) 5 MG tablet Comments:   Reason for Stopping:         hydrOXYzine (ATARAX) 10 MG tablet Comments:   Reason for Stopping:         acetaminophen (TYLENOL) 500 MG tablet Comments:   Reason for Stopping:               Discharge Medication List as of 10/9/2020  6:03 PM          TO Miller NP    (Please note that portions of this note were completed with a voice recognition program. Efforts were made to edit the dictations but occasionally words are mis-transcribed.)          TO Stone NP  10/09/20 0009

## 2020-10-10 ENCOUNTER — CARE COORDINATION (OUTPATIENT)
Dept: CARE COORDINATION | Age: 40
End: 2020-10-10

## 2020-10-10 NOTE — CARE COORDINATION
1st outreach for Urgent Care visit/ COVID monitoring.  Unable to contact, left VM and requested call back       10/9/20 [facility] Urgent Care visit note;      Clinical Impressions       COVID-19 ruled out by laboratory testing

## 2020-10-11 LAB — SARS-COV-2: NOT DETECTED

## 2020-10-12 ENCOUNTER — CARE COORDINATION (OUTPATIENT)
Dept: CARE COORDINATION | Age: 40
End: 2020-10-12

## 2020-10-12 ENCOUNTER — TELEPHONE (OUTPATIENT)
Dept: INTERNAL MEDICINE CLINIC | Age: 40
End: 2020-10-12

## 2020-10-12 NOTE — CARE COORDINATION
Patient contacted regarding recent discharge and COVID-19 risk. Discussed COVID-19 related testing which was available at this time. Test results were negative. Patient informed of results, if available? Yes     Care Transition Nurse/ Ambulatory Care Manager contacted the patient by telephone to perform post discharge assessment. Verified name and  with patient as identifiers. Patient has following risk factors of: no known risk factors. CTN/ACM reviewed discharge instructions, medical action plan and red flags related to discharge diagnosis. Reviewed and educated them on any new and changed medications related to discharge diagnosis. Advised obtaining a 90-day supply of all daily and as-needed medications. Education provided regarding infection prevention, and signs and symptoms of COVID-19 and when to seek medical attention with patient who verbalized understanding. Discussed exposure protocols and quarantine from [address] you at higher risk for severe illness  and given an opportunity for questions and concerns. The patient agrees to contact the COVID-19 hotline [phone] or PCP office for questions related to their healthcare. CTN/ACM provided contact information for future reference. From CDC: Are you at higher risk for severe illness?  Wash your hands often.  Avoid close contact (6 feet, which is about two arm lengths) with people who are sick.  Put distance between yourself and other people if COVID-19 is spreading in your community.  Clean and disinfect frequently touched surfaces.  Avoid all cruise travel and non-essential air travel.  Call your healthcare professional if you have concerns about COVID-19 and your underlying condition or if you are sick. For more information on steps you can take to protect yourself, see CDC's How to Protect Yourself    no further calls based on severity of symptoms and risk factors.     Spoke to patient, reports she feels

## 2021-03-15 ENCOUNTER — HOSPITAL ENCOUNTER (EMERGENCY)
Age: 41
Discharge: HOME | End: 2021-03-15
Payer: COMMERCIAL

## 2021-03-15 VITALS
HEIGHT: 67 IN | HEART RATE: 82 BPM | SYSTOLIC BLOOD PRESSURE: 126 MMHG | OXYGEN SATURATION: 98 % | BODY MASS INDEX: 36.73 KG/M2 | DIASTOLIC BLOOD PRESSURE: 73 MMHG | RESPIRATION RATE: 18 BRPM | WEIGHT: 234 LBS | TEMPERATURE: 97.7 F

## 2021-03-15 DIAGNOSIS — J06.9 VIRAL URI WITH COUGH: ICD-10-CM

## 2021-03-15 DIAGNOSIS — H65.03 NON-RECURRENT ACUTE SEROUS OTITIS MEDIA OF BOTH EARS: Primary | ICD-10-CM

## 2021-03-15 PROCEDURE — 99213 OFFICE O/P EST LOW 20 MIN: CPT

## 2021-03-15 PROCEDURE — 99214 OFFICE O/P EST MOD 30 MIN: CPT | Performed by: NURSE PRACTITIONER

## 2021-03-15 RX ORDER — AMOXICILLIN AND CLAVULANATE POTASSIUM 875; 125 MG/1; MG/1
1 TABLET, FILM COATED ORAL 2 TIMES DAILY
Qty: 14 TABLET | Refills: 0 | Status: SHIPPED | OUTPATIENT
Start: 2021-03-15 | End: 2021-03-22

## 2021-03-15 RX ORDER — ACETAMINOPHEN 325 MG/1
650 TABLET ORAL EVERY 6 HOURS PRN
COMMUNITY

## 2021-03-15 RX ORDER — FLUTICASONE PROPIONATE 50 MCG
1 SPRAY, SUSPENSION (ML) NASAL DAILY
Qty: 1 BOTTLE | Refills: 0 | Status: SHIPPED | OUTPATIENT
Start: 2021-03-15

## 2021-03-15 ASSESSMENT — ENCOUNTER SYMPTOMS
SHORTNESS OF BREATH: 0
TROUBLE SWALLOWING: 0
WHEEZING: 0
SINUS PAIN: 0
EYE REDNESS: 0
CHEST TIGHTNESS: 0
EYE DISCHARGE: 0
RHINORRHEA: 1
DIARRHEA: 0
NAUSEA: 0
FACIAL SWELLING: 0
SINUS PRESSURE: 0
COUGH: 1
SORE THROAT: 1
VOMITING: 0

## 2021-03-15 ASSESSMENT — PAIN DESCRIPTION - LOCATION: LOCATION: THROAT

## 2021-03-15 ASSESSMENT — PAIN DESCRIPTION - PROGRESSION: CLINICAL_PROGRESSION: GRADUALLY IMPROVING

## 2021-03-15 ASSESSMENT — PAIN DESCRIPTION - ONSET: ONSET: PROGRESSIVE

## 2021-03-15 NOTE — ED NOTES
Pt. Released in stable condition, ambulated per self to private car. Instructed pt to follow-up with family doctor as needed for recheck or go directly to the emergency department for any concerns/worsening conditions. Pt. Verbalized understanding of instructions. No questions at this time. RX in hand.       Garry Luna RN  03/15/21 [id number]

## 2021-03-15 NOTE — ED TRIAGE NOTES
To room with c/o nasal congestion that started Thursday. Symptoms have continued and she feels worse.

## 2021-03-15 NOTE — LETTER
[facility]  [address] [place] [id number]  Phone: [phone]               March 15, 2021    Patient: Negrita Campbell   YOB: 1980   Date of Visit: 3/15/2021       To Whom It May Concern:    Jose Juan Paul was seen and treated in our emergency department on 3/15/2021. She may return to work on 3/17/21.       Sincerely,       TO Khalil CNP         Signature:__________________________________

## 2021-03-15 NOTE — ED PROVIDER NOTES
[address]       Chief Complaint   Patient presents with    Nasal Congestion       Nurses Notes reviewed and I agree except as noted in the HPI. HISTORY OF PRESENT ILLNESS   Kely Ruth is a 38 y.o. female who presents with complaints of cough and ear pain. Onset of symptoms between 5 and 7 days ago, worsening. Symptom onset with a cough. Cough is intermittent, dry. Associated sinus congestion and sore throat. Sore throat with coughing/swallowing. No fever or trouble swallowing. No wheezing, chest pain, shortness of breath. No loss of taste or smell. New onset ear pain, worsening. Ear pain is continuous, bilateral.  Rates /10. No otorrhea. No travel. She has had exposure to similar symptoms. No COVID-19 exposure. No improvement with over-the-counter medicine. REVIEW OF SYSTEMS     Review of Systems   Constitutional: Negative for chills, diaphoresis, fatigue and fever. HENT: Positive for congestion, ear pain, postnasal drip, rhinorrhea and sore throat. Negative for ear discharge, facial swelling, hearing loss, sinus pressure, sinus pain and trouble swallowing. Eyes: Negative for discharge and redness. Respiratory: Positive for cough. Negative for chest tightness, shortness of breath and wheezing. Cardiovascular: Negative for chest pain. Gastrointestinal: Negative for diarrhea, nausea and vomiting. Genitourinary: Negative for decreased urine volume. Musculoskeletal: Negative for neck pain and neck stiffness. Skin: Negative for rash. Neurological: Negative for headaches. Hematological: Negative for adenopathy. Psychiatric/Behavioral: Negative for sleep disturbance.        PAST MEDICAL HISTORY         Diagnosis Date    Depression with anxiety     Essential hypertension 2018       SURGICAL HISTORY     Patient  has a past surgical history that includes Tubal ligation () and  section (2004/2006). CURRENT MEDICATIONS       Discharge Medication List as of 3/15/2021  2:06 PM      CONTINUE these medications which have NOT CHANGED    Details   Chlorphen-Pseudoephed-APAP (THERAFLU FLU/COLD PO) Take by mouthHistorical Med      acetaminophen (TYLENOL) 325 MG tablet Take 650 mg by mouth every 6 hours as needed for PainHistorical Med             ALLERGIES     Patient is has No Known Allergies. FAMILY HISTORY     Patient'sfamily history includes Cancer in her paternal grandmother; Cancer (age of onset: 54) in her maternal grandfather; Diabetes (age of onset: 36) in her mother; Heart Attack in her paternal grandmother; Heart Attack (age of onset: 36) in her father; Heart Disease in her paternal grandmother; High Blood Pressure in her mother. SOCIAL HISTORY     Patient  reports that she has been smoking cigarettes. She has a 3.00 pack-year smoking history. She has never used smokeless tobacco. She reports current alcohol use. She reports that she does not use drugs. PHYSICAL EXAM     ED TRIAGE VITALS  BP: 126/73, Temp: 97.7 °F (36.5 °C), Pulse: 82, Resp: 18, SpO2: 98 %  Physical Exam  Vitals signs and nursing note reviewed. Constitutional:       General: She is not in acute distress. Appearance: Normal appearance. She is well-developed. She is not ill-appearing, toxic-appearing or diaphoretic. HENT:      Head: Normocephalic and atraumatic. Right Ear: Hearing, ear canal and external ear normal. No drainage, swelling or tenderness. A middle ear effusion is present. No mastoid tenderness. No hemotympanum. Tympanic membrane is bulging. Tympanic membrane is not perforated or erythematous. Left Ear: Hearing, ear canal and external ear normal. No drainage, swelling or tenderness. A middle ear effusion is present. No mastoid tenderness. No hemotympanum. Tympanic membrane is bulging. Tympanic membrane is not perforated or erythematous. Nose: Mucosal edema and congestion present.  No rhinorrhea. Mouth/Throat:      Mouth: Mucous membranes are moist.      Pharynx: Oropharynx is clear. Uvula midline. Tonsils: No tonsillar abscesses. Eyes:      General: No scleral icterus. Conjunctiva/sclera: Conjunctivae normal.      Right eye: Right conjunctiva is not injected. No hemorrhage. Left eye: Left conjunctiva is not injected. No hemorrhage. Neck:      Musculoskeletal: Normal range of motion and neck supple. Thyroid: No thyromegaly. Trachea: Trachea normal.   Cardiovascular:      Rate and Rhythm: Normal rate and regular rhythm. No extrasystoles are present. Chest Wall: PMI is not displaced. Heart sounds: Normal heart sounds. No murmur. No friction rub. No gallop. Pulmonary:      Effort: Pulmonary effort is normal. No respiratory distress. Breath sounds: Normal breath sounds. Lymphadenopathy:      Head:      Right side of head: No submental, submandibular, tonsillar or occipital adenopathy. Left side of head: No submental, submandibular, tonsillar or occipital adenopathy. Cervical: No cervical adenopathy. Upper Body:      Right upper body: No supraclavicular adenopathy. Left upper body: No supraclavicular adenopathy. Skin:     General: Skin is warm and dry. Capillary Refill: Capillary refill takes less than 2 seconds. Coloration: Skin is not pale. Findings: No rash. Comments: Skin warm and dry to touch, no rashes noted on exposed surfaces. Neurological:      Mental Status: She is alert and oriented to person, place, and time. She is not disoriented. Psychiatric:         Mood and Affect: Mood normal.         Behavior: Behavior is cooperative. DIAGNOSTIC RESULTS   Labs: No results found for this visit on 03/15/21.     IMAGING:  No orders to display     URGENT CARE COURSE:     Vitals:    03/15/21 1349   BP: 126/73   Pulse: 82   Resp: 18   Temp: 97.7 °F (36.5 °C)   TempSrc: Temporal   SpO2: 98%   Weight: 234

## 2021-04-08 ENCOUNTER — HOSPITAL ENCOUNTER (EMERGENCY)
Age: 41
Discharge: HOME | End: 2021-04-08
Payer: COMMERCIAL

## 2021-04-08 VITALS
WEIGHT: 220 LBS | HEIGHT: 68 IN | RESPIRATION RATE: 16 BRPM | DIASTOLIC BLOOD PRESSURE: 77 MMHG | OXYGEN SATURATION: 98 % | BODY MASS INDEX: 33.34 KG/M2 | SYSTOLIC BLOOD PRESSURE: 115 MMHG | HEART RATE: 70 BPM | TEMPERATURE: 97.3 F

## 2021-04-08 DIAGNOSIS — J06.9 VIRAL URI WITH COUGH: Primary | ICD-10-CM

## 2021-04-08 DIAGNOSIS — Z20.822 EXPOSURE TO COVID-19 VIRUS: ICD-10-CM

## 2021-04-08 PROCEDURE — U0003 INFECTIOUS AGENT DETECTION BY NUCLEIC ACID (DNA OR RNA); SEVERE ACUTE RESPIRATORY SYNDROME CORONAVIRUS 2 (SARS-COV-2) (CORONAVIRUS DISEASE [COVID-19]), AMPLIFIED PROBE TECHNIQUE, MAKING USE OF HIGH THROUGHPUT TECHNOLOGIES AS DESCRIBED BY CMS-2020-01-R: HCPCS

## 2021-04-08 PROCEDURE — 99213 OFFICE O/P EST LOW 20 MIN: CPT

## 2021-04-08 PROCEDURE — 99213 OFFICE O/P EST LOW 20 MIN: CPT | Performed by: NURSE PRACTITIONER

## 2021-04-08 ASSESSMENT — ENCOUNTER SYMPTOMS
CHEST TIGHTNESS: 0
NAUSEA: 0
EYE REDNESS: 0
DIARRHEA: 0
VOMITING: 0
SHORTNESS OF BREATH: 0
RHINORRHEA: 0
TROUBLE SWALLOWING: 0
COUGH: 1
EYE DISCHARGE: 0
SORE THROAT: 0
WHEEZING: 0

## 2021-04-08 ASSESSMENT — PAIN DESCRIPTION - PAIN TYPE: TYPE: ACUTE PAIN

## 2021-04-08 NOTE — ED PROVIDER NOTES
[address]       Chief Complaint   Patient presents with    Covid Testing    Nasal Congestion    Cough       Nurses Notes reviewed and I agree except as noted in the HPI. HISTORY OF PRESENT ILLNESS   Megan Regalado is a 38 y.o. female who presents with complaints of cough. Onset 2 weeks ago, unchanged. Cough is intermittent, dry. Associated nasal congestion. No sinus pain, pressure, headache. No loss of taste or smell. No fever, wheezing, shortness of breath. No travel. Patient states she was recently exposed to COVID-19. No current treatment. REVIEW OF SYSTEMS     Review of Systems   Constitutional: Negative for chills, diaphoresis, fatigue and fever. HENT: Positive for congestion. Negative for ear pain, rhinorrhea, sore throat and trouble swallowing. Eyes: Negative for discharge and redness. Respiratory: Positive for cough. Negative for chest tightness, shortness of breath and wheezing. Cardiovascular: Negative for chest pain. Gastrointestinal: Negative for diarrhea, nausea and vomiting. Genitourinary: Negative for decreased urine volume. Musculoskeletal: Negative for neck pain and neck stiffness. Skin: Negative for rash. Neurological: Negative for headaches. Hematological: Negative for adenopathy. Psychiatric/Behavioral: Negative for sleep disturbance. PAST MEDICAL HISTORY         Diagnosis Date    Depression with anxiety     Essential hypertension 2018       SURGICAL HISTORY     Patient  has a past surgical history that includes Tubal ligation () and  section ().     CURRENT MEDICATIONS       Discharge Medication List as of 2021  8:43 AM      CONTINUE these medications which have NOT CHANGED    Details   acetaminophen (TYLENOL) 325 MG tablet Take 650 mg by mouth every 6 hours as needed for PainHistorical Med      fluticasone (FLONASE) 50 MCG/ACT nasal spray 1 spray by Each Nostril route daily, Disp-1 Bottle, R-0Normal             ALLERGIES     Patient is has No Known Allergies. FAMILY HISTORY     Patient'sfamily history includes Cancer in her paternal grandmother; Cancer (age of onset: 54) in her maternal grandfather; Diabetes (age of onset: 36) in her mother; Heart Attack in her paternal grandmother; Heart Attack (age of onset: 36) in her father; Heart Disease in her paternal grandmother; High Blood Pressure in her mother. SOCIAL HISTORY     Patient  reports that she has been smoking cigarettes. She has a 3.00 pack-year smoking history. She has never used smokeless tobacco. She reports current alcohol use. She reports that she does not use drugs. PHYSICAL EXAM     ED TRIAGE VITALS  BP: 115/77, Temp: 97.3 °F (36.3 °C), Pulse: 70, Resp: 16, SpO2: 98 %  Physical Exam  Vitals signs and nursing note reviewed. Constitutional:       General: She is not in acute distress. Appearance: Normal appearance. She is well-developed. She is not ill-appearing, toxic-appearing or diaphoretic. HENT:      Head: Normocephalic and atraumatic. Right Ear: Hearing, tympanic membrane, ear canal and external ear normal. No mastoid tenderness. No hemotympanum. Tympanic membrane is not perforated, erythematous or bulging. Left Ear: Hearing, tympanic membrane, ear canal and external ear normal. No mastoid tenderness. No hemotympanum. Tympanic membrane is not perforated, erythematous or bulging. Nose: Congestion present. No rhinorrhea. Mouth/Throat:      Mouth: Mucous membranes are moist.      Pharynx: Oropharynx is clear. Uvula midline. Tonsils: No tonsillar abscesses. Eyes:      General: No scleral icterus. Conjunctiva/sclera: Conjunctivae normal.      Right eye: Right conjunctiva is not injected. No hemorrhage. Left eye: Left conjunctiva is not injected. No hemorrhage. Neck:      Musculoskeletal: Normal range of motion and neck supple. Thyroid: No thyromegaly. Trachea: Trachea normal.   Cardiovascular:      Rate and Rhythm: Normal rate and regular rhythm. No extrasystoles are present. Chest Wall: PMI is not displaced. Heart sounds: Normal heart sounds. No murmur. No friction rub. No gallop. Pulmonary:      Effort: Pulmonary effort is normal. No respiratory distress. Breath sounds: Normal breath sounds. Lymphadenopathy:      Head:      Right side of head: No submental, submandibular, tonsillar or occipital adenopathy. Left side of head: No submental, submandibular, tonsillar or occipital adenopathy. Cervical: No cervical adenopathy. Upper Body:      Right upper body: No supraclavicular adenopathy. Left upper body: No supraclavicular adenopathy. Skin:     General: Skin is warm and dry. Capillary Refill: Capillary refill takes less than 2 seconds. Coloration: Skin is not pale. Findings: No rash. Comments: Skin warm and dry to touch, no rashes noted on exposed surfaces. Neurological:      Mental Status: She is alert and oriented to person, place, and time. She is not disoriented. Psychiatric:         Mood and Affect: Mood normal.         Behavior: Behavior is cooperative. DIAGNOSTIC RESULTS   Labs: No results found for this visit on 04/08/21. IMAGING:  No orders to display     URGENT CARE COURSE:     Vitals:    04/08/21 0825   BP: 115/77   Pulse: 70   Resp: 16   Temp: 97.3 °F (36.3 °C)   SpO2: 98%   Weight: 220 lb (99.8 kg)   Height: 5' 8\" (1.727 m)       Medications - No data to display  PROCEDURES:  None  FINALIMPRESSION      1. Viral URI with cough    2. Exposure to COVID-19 virus        DISPOSITION/PLAN   DISPOSITION Decision To Discharge 04/08/2021 08:42:53 AM  Toxic, no distress. Exam consistent with viral URI with cough. Mucinex over-the-counter. Increase fluids. COVID-19 results pending. If symptoms worsen go to ER.   PATIENT REFERRED TO:  Iris Griffith TO Lars[facility] [address] [place] [address]    In 1 week  Follow up as needed. Mucinex OTC. If worse go to ER.     DISCHARGE MEDICATIONS:  Discharge Medication List as of 4/8/2021  8:43 AM        Discharge Medication List as of 4/8/2021  8:43 AM          [address], TO - CNP  04/08/21 1415

## 2021-04-08 NOTE — ED NOTES
Pt discharged. Pt verbalzied understanding of discharge instructions. Pt walked out with daughter. Pt In stable condition.      Rahat Varela LPN  70/70/81 1533

## 2021-04-09 ENCOUNTER — CARE COORDINATION (OUTPATIENT)
Dept: CARE COORDINATION | Age: 41
End: 2021-04-09

## 2021-04-09 NOTE — CARE COORDINATION
Received message from pt returning call. Attempted to call back. No answer. Message left to return call.

## 2021-04-11 LAB — SARS-COV-2: NOT DETECTED

## 2021-10-18 ENCOUNTER — HOSPITAL ENCOUNTER (EMERGENCY)
Age: 41
Discharge: HOME | End: 2021-10-18
Payer: COMMERCIAL

## 2021-10-18 VITALS
RESPIRATION RATE: 16 BRPM | HEART RATE: 67 BPM | OXYGEN SATURATION: 100 % | BODY MASS INDEX: 36.83 KG/M2 | WEIGHT: 243 LBS | TEMPERATURE: 97.5 F | DIASTOLIC BLOOD PRESSURE: 87 MMHG | HEIGHT: 68 IN | SYSTOLIC BLOOD PRESSURE: 141 MMHG

## 2021-10-18 DIAGNOSIS — J06.9 VIRAL URI WITH COUGH: Primary | ICD-10-CM

## 2021-10-18 DIAGNOSIS — Z20.822 ENCOUNTER FOR LABORATORY TESTING FOR COVID-19 VIRUS: ICD-10-CM

## 2021-10-18 LAB — SARS-COV-2, NAA: NOT  DETECTED

## 2021-10-18 PROCEDURE — 99213 OFFICE O/P EST LOW 20 MIN: CPT | Performed by: NURSE PRACTITIONER

## 2021-10-18 PROCEDURE — 87635 SARS-COV-2 COVID-19 AMP PRB: CPT

## 2021-10-18 PROCEDURE — 99213 OFFICE O/P EST LOW 20 MIN: CPT

## 2021-10-18 ASSESSMENT — ENCOUNTER SYMPTOMS
EYE DISCHARGE: 0
CHEST TIGHTNESS: 0
NAUSEA: 0
VOMITING: 0
DIARRHEA: 0
SINUS PAIN: 0
TROUBLE SWALLOWING: 0
SORE THROAT: 1
RHINORRHEA: 1
COUGH: 1
SHORTNESS OF BREATH: 0
EYE REDNESS: 0
WHEEZING: 0
SINUS PRESSURE: 0

## 2021-10-18 NOTE — ED PROVIDER NOTES
[address]       Chief Complaint   Patient presents with    Covid Testing    Cough    Pharyngitis       Nurses Notes reviewed and I agree except as noted in the HPI. HISTORY OF PRESENT ILLNESS   Mikala Major is a 38 y.o. female who presents with complaints of cough and sore throat. Onset 3 days ago, unchanged. Cough is intermittent, dry. Associated nasal congestion and sore throat. No fever, wheezing, shortness of breath. Possible exposure to COVID-19. Possible history of allergies. Patient states symptoms began after she slept under a fan that was running. No treatment prior to arrival.    REVIEW OF SYSTEMS     Review of Systems   Constitutional: Negative for chills, diaphoresis, fatigue and fever. HENT: Positive for congestion, postnasal drip, rhinorrhea and sore throat. Negative for ear pain, sinus pressure, sinus pain and trouble swallowing. Eyes: Negative for discharge and redness. Respiratory: Positive for cough. Negative for chest tightness, shortness of breath and wheezing. Cardiovascular: Negative for chest pain. Gastrointestinal: Negative for diarrhea, nausea and vomiting. Genitourinary: Negative for decreased urine volume. Musculoskeletal: Negative for neck pain and neck stiffness. Skin: Negative for rash. Neurological: Negative for headaches. Hematological: Negative for adenopathy. Psychiatric/Behavioral: Negative for sleep disturbance. PAST MEDICAL HISTORY         Diagnosis Date    Depression with anxiety     Essential hypertension 2018       SURGICAL HISTORY     Patient  has a past surgical history that includes Tubal ligation () and  section ().     CURRENT MEDICATIONS       Previous Medications    ACETAMINOPHEN (TYLENOL) 325 MG TABLET    Take 650 mg by mouth every 6 hours as needed for Pain    FLUTICASONE (FLONASE) 50 MCG/ACT NASAL SPRAY    1 spray by Each Nostril route daily       ALLERGIES     Patient is has No Known Allergies. FAMILY HISTORY     Patient'sfamily history includes Cancer in her paternal grandmother; Cancer (age of onset: 54) in her maternal grandfather; Diabetes (age of onset: 36) in her mother; Heart Attack in her paternal grandmother; Heart Attack (age of onset: 36) in her father; Heart Disease in her paternal grandmother; High Blood Pressure in her mother. SOCIAL HISTORY     Patient  reports that she has been smoking cigarettes. She has a 3.00 pack-year smoking history. She has never used smokeless tobacco. She reports current alcohol use. She reports that she does not use drugs. PHYSICAL EXAM     ED TRIAGE VITALS  BP: (!) 141/87, Temp: 97.5 °F (36.4 °C), Pulse: 67, Resp: 16, SpO2: 100 %  Physical Exam    DIAGNOSTIC RESULTS   Labs:   Results for orders placed or performed during the hospital encounter of 10/18/21   COVID-19, Rapid   Result Value Ref Range    SARS-CoV-2, GIO NOT  DETECTED NOT DETECTED       IMAGING:  No orders to display     URGENT CARE COURSE:     Vitals:    10/18/21 1710   BP: (!) 141/87   Pulse: 67   Resp: 16   Temp: 97.5 °F (36.4 °C)   TempSrc: Temporal   SpO2: 100%   Weight: 243 lb (110.2 kg)   Height: 5' 8\" (1.727 m)       Medications - No data to display  PROCEDURES:  None  FINALIMPRESSION      1. Viral URI with cough    2. Encounter for laboratory testing for COVID-19 virus        DISPOSITION/PLAN   DISPOSITION Decision To Discharge 10/18/2021 05:40:41 PM  Nontoxic, no distress. COVID-19 negative. Allergy medication recommended over-the-counter. Increase fluids. If symptoms worsen return or go to ER. PATIENT REFERRED TO:  [address] [place] [address] Urgent Care  [id number] [facility]  [phone]    Follow-up as needed. Allergy medication over-the-counter. Increase fluids. If symptoms worsen return or go to ER.     DISCHARGE MEDICATIONS:  New Prescriptions    No medications on file     Current Discharge Medication List          [address]25 Yogi Nelson, APRN - CNP  10/18/21 6108

## 2021-10-18 NOTE — ED NOTES
To [facility] with complaints of nasal congestion, sore throat. Started 10.15. Was exposed at work beginning of last week.       Ankit Henao RN  10/18/21 5430

## 2021-12-20 ENCOUNTER — HOSPITAL ENCOUNTER (EMERGENCY)
Age: 41
Discharge: HOME | End: 2021-12-20
Payer: COMMERCIAL

## 2021-12-20 VITALS
BODY MASS INDEX: 34.97 KG/M2 | RESPIRATION RATE: 18 BRPM | WEIGHT: 230 LBS | OXYGEN SATURATION: 100 % | TEMPERATURE: 98.9 F | SYSTOLIC BLOOD PRESSURE: 152 MMHG | DIASTOLIC BLOOD PRESSURE: 68 MMHG | HEART RATE: 89 BPM

## 2021-12-20 DIAGNOSIS — J06.9 UPPER RESPIRATORY TRACT INFECTION, UNSPECIFIED TYPE: Primary | ICD-10-CM

## 2021-12-20 LAB — SARS-COV-2, NAA: NOT  DETECTED

## 2021-12-20 PROCEDURE — 87635 SARS-COV-2 COVID-19 AMP PRB: CPT

## 2021-12-20 PROCEDURE — 99213 OFFICE O/P EST LOW 20 MIN: CPT

## 2021-12-20 PROCEDURE — 99213 OFFICE O/P EST LOW 20 MIN: CPT | Performed by: NURSE PRACTITIONER

## 2021-12-20 RX ORDER — BENZONATATE 200 MG/1
200 CAPSULE ORAL 3 TIMES DAILY PRN
Qty: 30 CAPSULE | Refills: 0 | Status: SHIPPED | OUTPATIENT
Start: 2021-12-20 | End: 2021-12-27

## 2021-12-20 RX ORDER — PREDNISONE 20 MG/1
20 TABLET ORAL 2 TIMES DAILY
Qty: 10 TABLET | Refills: 0 | Status: SHIPPED | OUTPATIENT
Start: 2021-12-20 | End: 2021-12-25

## 2021-12-20 RX ORDER — GUAIFENESIN, PSEUDOEPHEDRINE HYDROCHLORIDE 600; 60 MG/1; MG/1
1 TABLET, EXTENDED RELEASE ORAL EVERY 12 HOURS PRN
Qty: 14 TABLET | Refills: 1 | Status: SHIPPED | OUTPATIENT
Start: 2021-12-20 | End: 2022-01-03

## 2021-12-20 ASSESSMENT — ENCOUNTER SYMPTOMS
SORE THROAT: 1
RHINORRHEA: 1
CHEST TIGHTNESS: 0
NAUSEA: 1
DIARRHEA: 0
SHORTNESS OF BREATH: 0
COUGH: 1
VOMITING: 0

## 2021-12-20 ASSESSMENT — PAIN SCALES - GENERAL: PAINLEVEL_OUTOF10: 7

## 2021-12-20 NOTE — LETTER
[facility]  [address] [place] [id number]  Phone: [phone]               December 20, 2021    Patient: Tomasa Main   YOB: 1980   Date of Visit: 12/20/2021       To Whom It May Concern:    Iman Miramontes was seen and treated in our emergency department on 12/20/2021. She may return to work on 12 21 21.       Sincerely,       Westley Stephenson RN         Signature:__________________________________

## 2021-12-20 NOTE — Clinical Note
He Olszewski was seen and treated in our emergency department on 12/20/2021. She may return to work on 12/23/2021. May be off work one to two days if needed     If you have any questions or concerns, please don't hesitate to call.       Matthieu Chance, APRN - CNP

## 2021-12-20 NOTE — ED PROVIDER NOTES
[facility]  Urgent Care Encounter       CHIEF COMPLAINT       Chief Complaint   Patient presents with    Cough    Migraine    Pharyngitis    Nausea       Nurses Notes reviewed and I agree except as noted in the HPI. HISTORY OF PRESENT ILLNESS   Callum Oswald is a 42 y.o. female who presents to the [facility] urgent care for evaluation of cough. She does report that she was exposed to Covid from a direct family member. She reports that her symptoms started roughly 4 days ago. She reports her symptoms are cough with green sputum, that is worse at night. He reports nasal congestion, rhinorrhea, and postnasal drainage. she reports a frontal headache, pharyngitis, nausea, dyspnea, sneezing, chills, generalized body aches. She denies vomiting, diarrhea, and loss of taste or smell. The history is provided by the patient. No  was used. REVIEW OF SYSTEMS     Review of Systems   Constitutional: Positive for fatigue. Negative for activity change, appetite change, chills and fever. HENT: Positive for congestion, postnasal drip, rhinorrhea and sore throat. Negative for ear discharge and ear pain. Respiratory: Positive for cough. Negative for chest tightness and shortness of breath. Cardiovascular: Negative for chest pain. Gastrointestinal: Positive for nausea. Negative for diarrhea and vomiting. Genitourinary: Negative for dysuria. Musculoskeletal: Positive for myalgias. Skin: Negative for rash. Allergic/Immunologic: Negative for environmental allergies and food allergies. Neurological: Positive for headaches. Negative for dizziness. PAST MEDICAL HISTORY         Diagnosis Date    Depression with anxiety     Essential hypertension 2018       SURGICALHISTORY     Patient  has a past surgical history that includes Tubal ligation () and  section ().     CURRENT MEDICATIONS       Discharge Medication List as of 12/20/2021  9:11 AM      CONTINUE these medications which have NOT CHANGED    Details   acetaminophen (TYLENOL) 325 MG tablet Take 650 mg by mouth every 6 hours as needed for PainHistorical Med      fluticasone (FLONASE) 50 MCG/ACT nasal spray 1 spray by Each Nostril route daily, Disp-1 Bottle, R-0Normal             ALLERGIES     Patient is has No Known Allergies. Patients   Immunization History   Administered Date(s) Administered    Td, unspecified formulation 07/05/2016       FAMILY HISTORY     Patient's family history includes Cancer in her paternal grandmother; Cancer (age of onset: 54) in her maternal grandfather; Diabetes (age of onset: 36) in her mother; Heart Attack in her paternal grandmother; Heart Attack (age of onset: 36) in her father; Heart Disease in her paternal grandmother; High Blood Pressure in her mother. SOCIAL HISTORY     Patient  reports that she has been smoking cigarettes. She has a 3.00 pack-year smoking history. She has never used smokeless tobacco. She reports current alcohol use. She reports that she does not use drugs. PHYSICAL EXAM     ED TRIAGE VITALS  BP: (!) 152/68, Temp: 98.9 °F (37.2 °C), Pulse: 89, Resp: 18, SpO2: 100 %,Estimated body mass index is 34.97 kg/m² as calculated from the following:    Height as of 10/18/21: 5' 8\" (1.727 m). Weight as of this encounter: 230 lb (104.3 kg). ,Patient's last menstrual period was 12/20/2021. Physical Exam  Vitals and nursing note reviewed. Constitutional:       General: She is not in acute distress. Appearance: Normal appearance. She is not ill-appearing, toxic-appearing or diaphoretic. HENT:      Head: Normocephalic. Right Ear: Ear canal and external ear normal.      Left Ear: Ear canal and external ear normal.      Nose: Nose normal. No congestion or rhinorrhea. Mouth/Throat:      Mouth: Mucous membranes are moist.      Pharynx: Oropharynx is clear.  No oropharyngeal exudate or posterior oropharyngeal file.  No primary physician on file.       DISCHARGE MEDICATIONS:  Discharge Medication List as of 12/20/2021  9:11 AM      START taking these medications    Details   pseudoephedrine-guaiFENesin (MUCINEX D)  MG per extended release tablet Take 1 tablet by mouth every 12 hours as needed for Congestion, Disp-14 tablet, R-1Normal      benzonatate (TESSALON) 200 MG capsule Take 1 capsule by mouth 3 times daily as needed for Cough, Disp-30 capsule, R-0Normal      predniSONE (DELTASONE) 20 MG tablet Take 1 tablet by mouth 2 times daily for 5 days, Disp-10 tablet, R-0Normal             Discharge Medication List as of 12/20/2021  9:11 AM          Discharge Medication List as of 12/20/2021  9:11 AM          TO Bell CNP    (Please note that portions of this note were completed with a voice recognition program. Efforts were made to edit the dictations but occasionally words are mis-transcribed.)           TO Bell CNP  12/20/21 7560

## 2021-12-20 NOTE — Clinical Note
Alex Freeman was seen and treated in our emergency department on 12/20/2021. She may return to work on 12/23/2021. May be off work one to two days if needed     If you have any questions or concerns, please don't hesitate to call.       Gayle Villalobos, APRN - CNP

## 2021-12-20 NOTE — Clinical Note
Ana Head was seen and treated in our emergency department on 12/20/2021. She may return to work on 12/23/2021. May be off work one to two days if needed     If you have any questions or concerns, please don't hesitate to call.       Vijay Martínez, APRN - CNP

## 2021-12-20 NOTE — Clinical Note
Iman Miramontes was seen and treated in our emergency department on 12/20/2021. She may return to work on 12/23/2021. May be off work one to two days if needed     If you have any questions or concerns, please don't hesitate to call.       Karen Sullivan, APRN - CNP

## 2022-02-12 ENCOUNTER — HOSPITAL ENCOUNTER (EMERGENCY)
Age: 42
Discharge: HOME | End: 2022-02-12
Payer: COMMERCIAL

## 2022-02-12 VITALS
OXYGEN SATURATION: 100 % | TEMPERATURE: 98.3 F | DIASTOLIC BLOOD PRESSURE: 84 MMHG | SYSTOLIC BLOOD PRESSURE: 140 MMHG | HEART RATE: 80 BPM | RESPIRATION RATE: 16 BRPM

## 2022-02-12 DIAGNOSIS — U07.1 COVID-19 VIRUS INFECTION: Primary | ICD-10-CM

## 2022-02-12 LAB — SARS-COV-2, NAA: DETECTED

## 2022-02-12 PROCEDURE — 87635 SARS-COV-2 COVID-19 AMP PRB: CPT

## 2022-02-12 PROCEDURE — 99213 OFFICE O/P EST LOW 20 MIN: CPT

## 2022-02-12 PROCEDURE — 99213 OFFICE O/P EST LOW 20 MIN: CPT | Performed by: EMERGENCY MEDICINE

## 2022-02-12 ASSESSMENT — PAIN SCALES - GENERAL: PAINLEVEL_OUTOF10: 5

## 2022-02-12 ASSESSMENT — ENCOUNTER SYMPTOMS
COUGH: 1
NAUSEA: 0
SHORTNESS OF BREATH: 0
DIARRHEA: 1
ABDOMINAL PAIN: 0
RHINORRHEA: 1
VOMITING: 0

## 2022-02-12 NOTE — Clinical Note
Chandu Crooks was seen and treated in our emergency department on 2/12/2022. She may return to work on 02/15/2022. If you have any questions or concerns, please don't hesitate to call.       Taylor Ramirez, TO - CNP

## 2022-02-12 NOTE — ED PROVIDER NOTES
[facility]       CHIEF COMPLAINT       Chief Complaint   Patient presents with    Concern For COVID-19     chills, grandson positive       Nurses Notes reviewed and I agree except as noted in the HPI. HISTORY OF PRESENT ILLNESS   Bonnie Posadas is a 42 y.o. female who presents for complaints of body aches, chills, subjective fever. Symptoms have been present for 3 days. She states she was recently with her daughter and grand baby. Grandchild currently has COVID-19. Denies chest pain. No shortness of breath. Patient has not been vaccinated for COVID-19 and has not had previous Covid infection    HPI    REVIEW OF SYSTEMS     Review of Systems   Constitutional: Positive for chills, fatigue and fever. HENT: Positive for congestion and rhinorrhea. Respiratory: Positive for cough. Negative for shortness of breath. Gastrointestinal: Positive for diarrhea. Negative for abdominal pain, nausea and vomiting. Genitourinary: Negative for difficulty urinating, dysuria and urgency. Musculoskeletal: Positive for arthralgias and myalgias. Neurological: Positive for headaches. Negative for dizziness. PAST MEDICAL HISTORY         Diagnosis Date    Depression with anxiety     Essential hypertension 2018       SURGICALHISTORY     Patient  has a past surgical history that includes Tubal ligation () and  section (/). CURRENT MEDICATIONS       Discharge Medication List as of 2022 12:47 PM      CONTINUE these medications which have NOT CHANGED    Details   fluticasone (FLONASE) 50 MCG/ACT nasal spray 1 spray by Each Nostril route daily, Disp-1 Bottle, R-0Normal      acetaminophen (TYLENOL) 325 MG tablet Take 650 mg by mouth every 6 hours as needed for PainHistorical Med             ALLERGIES     Patient is has No Known Allergies.     Patients   Immunization History   Administered Date(s) Administered    Td, unspecified formulation 07/05/2016       FAMILY HISTORY     Patient's family history includes Cancer in her paternal grandmother; Cancer (age of onset: 54) in her maternal grandfather; Diabetes (age of onset: 36) in her mother; Heart Attack in her paternal grandmother; Heart Attack (age of onset: 36) in her father; Heart Disease in her paternal grandmother; High Blood Pressure in her mother. SOCIAL HISTORY     Patient  reports that she has been smoking cigarettes. She has a 3.00 pack-year smoking history. She has never used smokeless tobacco. She reports current alcohol use. She reports that she does not use drugs. PHYSICAL EXAM     ED TRIAGE VITALS  BP: (!) 140/84, Temp: 98.3 °F (36.8 °C), Pulse: 80, Resp: 16, SpO2: 100 %,Estimated body mass index is 34.97 kg/m² as calculated from the following:    Height as of 10/18/21: 5' 8\" (1.727 m). Weight as of 12/20/21: 230 lb (104.3 kg). ,No LMP recorded. Physical Exam  Constitutional:       Appearance: She is ill-appearing. She is not toxic-appearing. HENT:      Head: Normocephalic. Nose: No congestion. Mouth/Throat:      Mouth: Mucous membranes are moist.      Pharynx: Oropharynx is clear. No oropharyngeal exudate. Cardiovascular:      Rate and Rhythm: Normal rate and regular rhythm. Pulses: Normal pulses. Heart sounds: Normal heart sounds. Pulmonary:      Effort: Pulmonary effort is normal. No respiratory distress. Breath sounds: Normal breath sounds. No wheezing. Abdominal:      General: Abdomen is flat. Bowel sounds are normal. There is no distension. Tenderness: There is no abdominal tenderness. Skin:     General: Skin is warm and dry. Capillary Refill: Capillary refill takes less than 2 seconds. Neurological:      General: No focal deficit present. Mental Status: She is alert.    Psychiatric:         Mood and Affect: Mood normal.         Behavior: Behavior normal.         DIAGNOSTIC RESULTS     Labs:  Results for orders placed or performed during the hospital encounter of 02/12/22   COVID-19, Rapid   Result Value Ref Range    SARS-CoV-2, GIO DETECTED (AA) NOT DETECTED       IMAGING:    No orders to display         EKG:      URGENT CARE COURSE:     Vitals:    02/12/22 1226   BP: (!) 140/84   Pulse: 80   Resp: 16   Temp: 98.3 °F (36.8 °C)   SpO2: 100%       Medications - No data to display         PROCEDURES:  None    FINAL IMPRESSION      1. COVID-19 virus infection          DISPOSITION/ PLAN     Presents for what is determined the COVID-19 viral infection. No respiratory distress. Patient will be discharged and advised to alternate Tylenol and ibuprofen for treatment of symptoms. Advised to distance from others is much as possible. Drink plenty of fluids. Rest but make sure she ambulates around the house frequently. Return for new or worsening symptoms    PATIENT REFERRED TO:  No primary care provider on file. No primary physician on file.       DISCHARGE MEDICATIONS:  Discharge Medication List as of 2/12/2022 12:47 PM          Discharge Medication List as of 2/12/2022 12:47 PM          Discharge Medication List as of 2/12/2022 12:47 PM          TO Lockhart CNP    (Please note that portions of this note were completed with a voice recognition program. Efforts were made to edit the dictations but occasionally words are mis-transcribed.)          TO Lockhart CNP  02/12/22 1257

## 2022-02-12 NOTE — ED NOTES
Nasopharyngeal swab obtained, pt tolerated well    advised to call back for any additional questions or concerns     Pt discharged in stable condition, ambulated to vehicle home by self.          Gladys Wilkinson LPN  32/81/99 72

## 2022-02-14 ENCOUNTER — CARE COORDINATION (OUTPATIENT)
Dept: CARE COORDINATION | Age: 42
End: 2022-02-14

## 2022-02-14 NOTE — CARE COORDINATION
Patient contacted regarding COVID-19 diagnosis. Discussed COVID-19 related testing which was available at this time. Test results were positive. Patient informed of results, if available? Yes. Ambulatory Care Manager contacted the patient by telephone to perform post discharge assessment. Call within 2 business days of discharge: Yes. Verified name and  with patient as identifiers. Provided introduction to self, and explanation of the CTN/ACM role, and reason for call due to risk factors for infection and/or exposure to COVID-19. Symptoms reviewed with patient who verbalized the following symptoms: cough, shortness of breath, nausea, diarrhea, no new symptoms and no worsening symptoms. Fatigue  Due to no new or worsening symptoms encounter was not routed to provider for escalation. Discussed follow-up appointments. If no appointment was previously scheduled, appointment scheduling offered: pt reports that she has a PCP. Did not provide me with the name of her PCP. Advised to contact PCP office today and arrange appt. [facility] follow up appointment(s): No future appointments. Non-[facility] follow up appointment(s): see above    Non-face-to-face services provided:  Obtained and reviewed discharge summary and/or continuity of care documents     Advance Care Planning:   Does patient have an Advance Directive:  not on file. Educated patient about risk for severe COVID-19 due to risk factors according to CDC guidelines. ACM reviewed discharge instructions, medical action plan and red flag symptoms with the patient who verbalized understanding. Discussed COVID vaccination status: No. Education provided on COVID-19 vaccination as appropriate. Discussed exposure protocols and quarantine with CDC Guidelines. Patient was given an opportunity to verbalize any questions and concerns and agrees to contact ACM or health care provider for questions related to their healthcare.     Reviewed and educated patient on any new and changed medications related to discharge diagnosis     Was patient discharged with a pulse oximeter? No Discussed and confirmed pulse oximeter discharge instructions and when to notify provider or seek emergency care. ACM provided contact information. Plan for follow-up call in 3-5 days based on severity of symptoms and risk factors.

## 2022-02-18 ENCOUNTER — CARE COORDINATION (OUTPATIENT)
Dept: CARE COORDINATION | Age: 42
End: 2022-02-18

## 2022-03-15 ENCOUNTER — HOSPITAL ENCOUNTER (EMERGENCY)
Age: 42
Discharge: HOME | End: 2022-03-15
Payer: COMMERCIAL

## 2022-03-15 VITALS
SYSTOLIC BLOOD PRESSURE: 139 MMHG | HEIGHT: 68 IN | HEART RATE: 75 BPM | BODY MASS INDEX: 33.49 KG/M2 | DIASTOLIC BLOOD PRESSURE: 81 MMHG | TEMPERATURE: 97.6 F | RESPIRATION RATE: 16 BRPM | WEIGHT: 221 LBS | OXYGEN SATURATION: 100 %

## 2022-03-15 DIAGNOSIS — J00 ACUTE RHINITIS: Primary | ICD-10-CM

## 2022-03-15 PROCEDURE — 99213 OFFICE O/P EST LOW 20 MIN: CPT | Performed by: NURSE PRACTITIONER

## 2022-03-15 PROCEDURE — G0463 HOSPITAL OUTPT CLINIC VISIT: HCPCS

## 2022-03-15 PROCEDURE — 99213 OFFICE O/P EST LOW 20 MIN: CPT

## 2022-03-15 RX ORDER — GUAIFENESIN, PSEUDOEPHEDRINE HYDROCHLORIDE 600; 60 MG/1; MG/1
1 TABLET, EXTENDED RELEASE ORAL EVERY 12 HOURS
Qty: 14 TABLET | Refills: 0 | Status: SHIPPED | OUTPATIENT
Start: 2022-03-15 | End: 2022-03-22

## 2022-03-15 ASSESSMENT — ENCOUNTER SYMPTOMS
COUGH: 0
SORE THROAT: 0
EYE DISCHARGE: 0
VOMITING: 0
NAUSEA: 0
TROUBLE SWALLOWING: 0
RHINORRHEA: 1
EYE REDNESS: 0
SINUS PAIN: 0
SINUS PRESSURE: 0
SHORTNESS OF BREATH: 0
DIARRHEA: 0

## 2022-03-15 NOTE — ED PROVIDER NOTES
[facility] Encounter      [address] [facility]       Chief Complaint   Patient presents with    Sinusitis       Nurses Notes reviewed and I agree except as noted in the HPI. HISTORY OF PRESENT ILLNESS   Poly Gregg is a 42 y.o. female who presents for evaluation of sinus congestion. Onset of symptoms last night with sinus congestion, rhinorrhea, sneezing. No fever. No loss of taste or smell. Patient had COVID-19 last month. No recent exposure to illness. No improvement with Benadryl. REVIEW OF SYSTEMS     Review of Systems   Constitutional: Positive for fatigue. Negative for chills, diaphoresis and fever. HENT: Positive for congestion, postnasal drip, rhinorrhea and sneezing. Negative for ear pain, nosebleeds, sinus pressure, sinus pain, sore throat and trouble swallowing. Eyes: Negative for discharge and redness. Respiratory: Negative for cough and shortness of breath. Cardiovascular: Negative for chest pain. Gastrointestinal: Negative for diarrhea, nausea and vomiting. Genitourinary: Negative for decreased urine volume. Musculoskeletal: Negative for neck pain and neck stiffness. Skin: Negative for rash. Neurological: Negative for headaches. Hematological: Negative for adenopathy. Psychiatric/Behavioral: Negative for sleep disturbance. PAST MEDICAL HISTORY         Diagnosis Date    Depression with anxiety     Essential hypertension 2018       SURGICAL HISTORY     Patient  has a past surgical history that includes Tubal ligation () and  section ().     CURRENT MEDICATIONS       Discharge Medication List as of 3/15/2022  4:33 PM      CONTINUE these medications which have NOT CHANGED    Details   acetaminophen (TYLENOL) 325 MG tablet Take 650 mg by mouth every 6 hours as needed for PainHistorical Med      fluticasone (FLONASE) 50 MCG/ACT nasal spray 1 spray by Each Nostril route daily, Disp-1 Bottle, R-0Normal ALLERGIES     Patient is has No Known Allergies. FAMILY HISTORY     Patient'sfamily history includes Cancer in her paternal grandmother; Cancer (age of onset: 54) in her maternal grandfather; Diabetes (age of onset: 36) in her mother; Heart Attack in her paternal grandmother; Heart Attack (age of onset: 36) in her father; Heart Disease in her paternal grandmother; High Blood Pressure in her mother. SOCIAL HISTORY     Patient  reports that she has been smoking cigarettes. She has a 3.00 pack-year smoking history. She has never used smokeless tobacco. She reports current alcohol use. She reports that she does not use drugs. PHYSICAL EXAM     ED TRIAGE VITALS  BP: 139/81, Temp: 97.6 °F (36.4 °C), Pulse: 75, Resp: 16, SpO2: 100 %  Physical Exam  Vitals and nursing note reviewed. Constitutional:       General: She is not in acute distress. Appearance: Normal appearance. She is well-developed. She is not ill-appearing, toxic-appearing or diaphoretic. HENT:      Head: Normocephalic and atraumatic. Right Ear: Hearing, tympanic membrane, ear canal and external ear normal. No mastoid tenderness. No hemotympanum. Tympanic membrane is not perforated, erythematous or bulging. Left Ear: Hearing, tympanic membrane, ear canal and external ear normal. No mastoid tenderness. No hemotympanum. Tympanic membrane is not perforated, erythematous or bulging. Nose: Congestion and rhinorrhea present. Rhinorrhea is clear. Mouth/Throat:      Mouth: Mucous membranes are moist.      Pharynx: Oropharynx is clear. Uvula midline. Tonsils: No tonsillar abscesses. Eyes:      General: No scleral icterus. Conjunctiva/sclera: Conjunctivae normal.      Right eye: Right conjunctiva is not injected. No hemorrhage. Left eye: Left conjunctiva is not injected. No hemorrhage. Neck:      Thyroid: No thyromegaly.       Trachea: Trachea normal.   Cardiovascular:      Rate and Rhythm: Normal rate and regular rhythm. No extrasystoles are present. Chest Wall: PMI is not displaced. Heart sounds: Normal heart sounds. No murmur heard. No friction rub. No gallop. Pulmonary:      Effort: Pulmonary effort is normal. No respiratory distress. Breath sounds: Normal breath sounds. Chest:   Breasts:      Right: No supraclavicular adenopathy. Left: No supraclavicular adenopathy. Musculoskeletal:      Cervical back: Normal range of motion and neck supple. Lymphadenopathy:      Head:      Right side of head: No submental, submandibular, tonsillar or occipital adenopathy. Left side of head: No submental, submandibular, tonsillar or occipital adenopathy. Cervical: No cervical adenopathy. Upper Body:      Right upper body: No supraclavicular adenopathy. Left upper body: No supraclavicular adenopathy. Skin:     General: Skin is warm and dry. Capillary Refill: Capillary refill takes less than 2 seconds. Coloration: Skin is not pale. Findings: No rash. Comments: Skin warm and dry to touch, no rashes noted on exposed surfaces. Neurological:      Mental Status: She is alert and oriented to person, place, and time. She is not disoriented. Psychiatric:         Mood and Affect: Mood normal.         Behavior: Behavior is cooperative. DIAGNOSTIC RESULTS   Labs: No results found for this visit on 03/15/22. IMAGING:  No orders to display     URGENT CARE COURSE:     Vitals:    03/15/22 1620   BP: 139/81   Pulse: 75   Resp: 16   Temp: 97.6 °F (36.4 °C)   TempSrc: Temporal   SpO2: 100%   Weight: 221 lb (100.2 kg)   Height: 5' 8\" (1.727 m)       Medications - No data to display  PROCEDURES:  None  FINALIMPRESSION      1. Acute rhinitis        DISPOSITION/PLAN   DISPOSITION Decision To Discharge 03/15/2022 04:32:26 PM  Nontoxic, no distress. Exam consistent with acute rhinitis. Mucinex D as prescribed. Sinus rinse twice daily.   If symptoms worsen return or go to ER. PATIENT REFERRED TO:  TO Pruett CNP  [address] [address]  [phone]      Follow-up as needed. Medication as prescribed. Sinus rinse twice daily. If symptoms worsen return or go to ER.     DISCHARGE MEDICATIONS:  Discharge Medication List as of 3/15/2022  4:33 PM      START taking these medications    Details   pseudoephedrine-guaiFENesin (MUCINEX D)  MG per extended release tablet Take 1 tablet by mouth every 12 hours for 7 days, Disp-14 tablet, R-0Normal           Discharge Medication List as of 3/15/2022  4:33 PM          Bonny MariKPC [facility] TO Mejia CNP  03/15/22 [id number]

## 2022-03-15 NOTE — Clinical Note
Nhung Marks was seen and treated in our emergency department on 3/15/2022. She may return to work on 03/16/2022. If you have any questions or concerns, please don't hesitate to call.       Irwin Cole, APRN - CNP

## 2022-03-15 NOTE — Clinical Note
Tiffanie Kelly was seen and treated in our emergency department on 3/15/2022. She may return to work on 03/17/2022. If you have any questions or concerns, please don't hesitate to call.       Eugenia Bagley, APRN - CNP

## 2022-08-23 ENCOUNTER — HOSPITAL ENCOUNTER (OUTPATIENT)
Dept: GENERAL RADIOLOGY | Age: 42
Discharge: HOME | End: 2022-08-23
Payer: COMMERCIAL

## 2022-08-23 ENCOUNTER — HOSPITAL ENCOUNTER (OUTPATIENT)
Age: 42
Discharge: HOME | End: 2022-08-23
Payer: COMMERCIAL

## 2022-08-23 DIAGNOSIS — M54.50 LOW BACK PAIN, UNSPECIFIED BACK PAIN LATERALITY, UNSPECIFIED CHRONICITY, UNSPECIFIED WHETHER SCIATICA PRESENT: ICD-10-CM

## 2022-08-23 DIAGNOSIS — R26.81 UNSTEADY GAIT: ICD-10-CM

## 2022-08-23 PROCEDURE — 72040 X-RAY EXAM NECK SPINE 2-3 VW: CPT

## 2022-08-23 PROCEDURE — 72100 X-RAY EXAM L-S SPINE 2/3 VWS: CPT

## 2022-08-23 PROCEDURE — 72070 X-RAY EXAM THORAC SPINE 2VWS: CPT

## 2024-02-08 ENCOUNTER — HOSPITAL ENCOUNTER (EMERGENCY)
Age: 44
Discharge: HOME | End: 2024-02-08
Payer: COMMERCIAL

## 2024-02-08 VITALS
TEMPERATURE: 99 F | RESPIRATION RATE: 18 BRPM | HEART RATE: 65 BPM | OXYGEN SATURATION: 99 % | SYSTOLIC BLOOD PRESSURE: 126 MMHG | DIASTOLIC BLOOD PRESSURE: 76 MMHG | WEIGHT: 232 LBS | HEIGHT: 68 IN | BODY MASS INDEX: 35.16 KG/M2

## 2024-02-08 DIAGNOSIS — K04.7 DENTAL INFECTION: ICD-10-CM

## 2024-02-08 DIAGNOSIS — K08.89 PAIN, DENTAL: Primary | ICD-10-CM

## 2024-02-08 PROCEDURE — 99213 OFFICE O/P EST LOW 20 MIN: CPT

## 2024-02-08 RX ORDER — CLINDAMYCIN HYDROCHLORIDE 300 MG/1
300 CAPSULE ORAL 3 TIMES DAILY
Qty: 30 CAPSULE | Refills: 0 | Status: SHIPPED | OUTPATIENT
Start: 2024-02-08 | End: 2024-02-18

## 2024-02-08 RX ORDER — IBUPROFEN 800 MG/1
800 TABLET ORAL 2 TIMES DAILY PRN
Qty: 180 TABLET | Refills: 0 | Status: SHIPPED | OUTPATIENT
Start: 2024-02-08

## 2024-02-08 ASSESSMENT — PAIN - FUNCTIONAL ASSESSMENT: PAIN_FUNCTIONAL_ASSESSMENT: 0-10

## 2024-02-08 ASSESSMENT — PAIN DESCRIPTION - DESCRIPTORS: DESCRIPTORS: SHARP;SHOOTING

## 2024-02-08 ASSESSMENT — PAIN DESCRIPTION - ORIENTATION: ORIENTATION: LEFT;LOWER;UPPER

## 2024-02-08 ASSESSMENT — ENCOUNTER SYMPTOMS: SINUS PRESSURE: 1

## 2024-02-08 ASSESSMENT — PAIN DESCRIPTION - LOCATION: LOCATION: TEETH

## 2024-02-08 ASSESSMENT — PAIN SCALES - GENERAL: PAINLEVEL_OUTOF10: 5

## 2024-02-08 NOTE — ED PROVIDER NOTES
[facility] URGENT CARE  Urgent Care Encounter       CHIEF COMPLAINT       Chief Complaint   Patient presents with    Dental Pain     Left upper and lower    Sinusitis    Otalgia     left       Nurses Notes reviewed and I agree except as noted in the HPI.  HISTORY OF PRESENT ILLNESS   Maggie Becerra is a 43 y.o. female who presents with complaints of left side dental pain for the last few weeks. Pt reports that pain got worse the last few days and caused pain in sinuses and ear pain. Pt reports taking tylenol Extra strength. Reports pain 5/10 at this time.     The history is provided by the patient.       REVIEW OF SYSTEMS     Review of Systems   HENT:  Positive for dental problem, ear pain and sinus pressure.    All other systems reviewed and are negative.      PAST MEDICAL HISTORY         Diagnosis Date    Depression with anxiety     Essential hypertension 2018       SURGICALHISTORY     Patient  has a past surgical history that includes Tubal ligation () and  section ().    CURRENT MEDICATIONS       Previous Medications    ACETAMINOPHEN (TYLENOL) 325 MG TABLET    Take 2 tablets by mouth every 6 hours as needed for Pain       ALLERGIES     Patient is has No Known Allergies.    Patients   Immunization History   Administered Date(s) Administered    Td, unspecified formulation 2016       FAMILY HISTORY     Patient's family history includes Cancer in her paternal grandmother; Cancer (age of onset: 55) in her maternal grandfather; Diabetes (age of onset: 40) in her mother; Heart Attack in her paternal grandmother; Heart Attack (age of onset: 40) in her father; Heart Disease in her paternal grandmother; High Blood Pressure in her mother.    SOCIAL HISTORY     Patient  reports that she has been smoking cigarettes. She has a 3.0 pack-year smoking history. She has never used smokeless tobacco. She reports current alcohol use. She reports that she does not use

## 2024-02-08 NOTE — ED NOTES
Pt with complaints of left upper and lowe dental pain that has been ongoing for \"weeks\" but got worse 2 days ago. States she now has pain in sinuses and left ear. States Tylenol helps with pain a little bit.     Chelsi Iraheta LPN  02/08/24 1014

## 2025-03-28 ENCOUNTER — HOSPITAL ENCOUNTER (EMERGENCY)
Age: 45
Discharge: HOME | End: 2025-03-28
Payer: COMMERCIAL

## 2025-03-28 VITALS
SYSTOLIC BLOOD PRESSURE: 120 MMHG | DIASTOLIC BLOOD PRESSURE: 84 MMHG | RESPIRATION RATE: 16 BRPM | HEIGHT: 68 IN | HEART RATE: 67 BPM | OXYGEN SATURATION: 100 % | WEIGHT: 235 LBS | TEMPERATURE: 97.7 F | BODY MASS INDEX: 35.61 KG/M2

## 2025-03-28 DIAGNOSIS — K08.89 PAIN, DENTAL: ICD-10-CM

## 2025-03-28 DIAGNOSIS — K04.7 DENTAL INFECTION: Primary | ICD-10-CM

## 2025-03-28 DIAGNOSIS — S02.5XXA CLOSED FRACTURE OF TOOTH, INITIAL ENCOUNTER: ICD-10-CM

## 2025-03-28 PROCEDURE — 99213 OFFICE O/P EST LOW 20 MIN: CPT

## 2025-03-28 RX ORDER — CLINDAMYCIN HYDROCHLORIDE 300 MG/1
300 CAPSULE ORAL 3 TIMES DAILY
Qty: 30 CAPSULE | Refills: 0 | Status: SHIPPED | OUTPATIENT
Start: 2025-03-28 | End: 2025-04-07

## 2025-03-28 RX ORDER — TRAMADOL HYDROCHLORIDE 50 MG/1
50 TABLET ORAL EVERY 4 HOURS PRN
Qty: 18 TABLET | Refills: 0 | Status: SHIPPED | OUTPATIENT
Start: 2025-03-28 | End: 2025-03-31

## 2025-03-28 RX ORDER — NAPROXEN 500 MG/1
500 TABLET ORAL 2 TIMES DAILY WITH MEALS
Qty: 60 TABLET | Refills: 0 | Status: SHIPPED | OUTPATIENT
Start: 2025-03-28

## 2025-03-28 ASSESSMENT — PAIN DESCRIPTION - PAIN TYPE: TYPE: ACUTE PAIN

## 2025-03-28 ASSESSMENT — PAIN DESCRIPTION - LOCATION: LOCATION: TEETH

## 2025-03-28 ASSESSMENT — PAIN DESCRIPTION - ORIENTATION: ORIENTATION: RIGHT;LEFT;LOWER;UPPER

## 2025-03-28 ASSESSMENT — PAIN - FUNCTIONAL ASSESSMENT: PAIN_FUNCTIONAL_ASSESSMENT: 0-10

## 2025-03-28 ASSESSMENT — PAIN SCALES - GENERAL: PAINLEVEL_OUTOF10: 10

## 2025-03-28 ASSESSMENT — PAIN DESCRIPTION - DESCRIPTORS: DESCRIPTORS: ACHING

## 2025-03-28 NOTE — ED TRIAGE NOTES
Pt c/o multiple broken teeth bilateral side of mouth but pain started approximately 1 week ago. Pt does have an appointment with [facility] 4/2/25 but pain is increasing and OTC tylenol is not helping alleviate pain.

## 2025-03-28 NOTE — ED PROVIDER NOTES
[facility] URGENT CARE  Urgent Care Encounter       CHIEF COMPLAINT       Chief Complaint   Patient presents with    Dental Pain     Pt has multiple broken teeth bilateral side of mouth        Nurses Notes reviewed and I agree except as noted in the HPI.  HISTORY OF PRESENT ILLNESS   Maggie Becerra is a 44 y.o. female who presents with complaints of dental pain that started a week ago. Pt reports that she finally has an appointment with dentist next week. Pt reports using tylenol without relieve. Pt reports pain 10/10 at this time.     The history is provided by the patient.       REVIEW OF SYSTEMS     Review of Systems   HENT:  Positive for dental problem.    All other systems reviewed and are negative.      PAST MEDICAL HISTORY         Diagnosis Date    Depression with anxiety     Essential hypertension 2018       SURGICALHISTORY     Patient  has a past surgical history that includes Tubal ligation () and  section ().    CURRENT MEDICATIONS       Previous Medications    ACETAMINOPHEN (TYLENOL) 325 MG TABLET    Take 2 tablets by mouth every 6 hours as needed for Pain       ALLERGIES     Patient is has no known allergies.    Patients   Immunization History   Administered Date(s) Administered    Td, unspecified formulation 2016       FAMILY HISTORY     Patient's family history includes Cancer in her paternal grandmother; Cancer (age of onset: 55) in her maternal grandfather; Diabetes (age of onset: 40) in her mother; Heart Attack in her paternal grandmother; Heart Attack (age of onset: 40) in her father; Heart Disease in her paternal grandmother; High Blood Pressure in her mother.    SOCIAL HISTORY     Patient  reports that she has been smoking cigarettes. She has a 3 pack-year smoking history. She has never used smokeless tobacco. She reports current alcohol use. She reports that she does not use drugs.    PHYSICAL EXAM     ED TRIAGE VITALS  BP: 120/84, Temp: 97.7 °F (36.5  MEDICATIONS:  New Prescriptions    CLINDAMYCIN (CLEOCIN) 300 MG CAPSULE    Take 1 capsule by mouth 3 times daily for 10 days    NAPROXEN (NAPROSYN) 500 MG TABLET    Take 1 tablet by mouth 2 times daily (with meals)    TRAMADOL (ULTRAM) 50 MG TABLET    Take 1 tablet by mouth every 4 hours as needed for Pain for up to 3 days. Intended supply: 3 days. Take lowest dose possible to manage pain Max Daily Amount: 300 mg       Discontinued Medications    IBUPROFEN (ADVIL;MOTRIN) 800 MG TABLET    Take 1 tablet by mouth 2 times daily as needed for Pain       Current Discharge Medication List          TO Dougherty CNP    (Please note that portions of this note were completed with a voice recognition program. Efforts were made to edit the dictations but occasionally words are mis-transcribed.)            Loreta Mendez APRN - CNP  03/28/25 [id number]

## 2025-03-28 NOTE — DISCHARGE INSTRUCTIONS
Tylenol every 4 hours  Naproxen 2x a day  Tramadol for extreme pain  Clindamycin 3x a day  Follow up with dentist on Wednesday  ER if symptoms worsen

## 2025-08-21 ENCOUNTER — HOSPITAL ENCOUNTER (EMERGENCY)
Age: 45
Discharge: HOME | End: 2025-08-21
Payer: COMMERCIAL

## 2025-08-21 VITALS
HEIGHT: 68 IN | WEIGHT: 232 LBS | SYSTOLIC BLOOD PRESSURE: 182 MMHG | DIASTOLIC BLOOD PRESSURE: 110 MMHG | RESPIRATION RATE: 18 BRPM | TEMPERATURE: 99 F | BODY MASS INDEX: 35.16 KG/M2 | HEART RATE: 68 BPM | OXYGEN SATURATION: 99 %

## 2025-08-21 DIAGNOSIS — K02.9 PAIN DUE TO DENTAL CARIES: Primary | ICD-10-CM

## 2025-08-21 PROCEDURE — 6370000000 HC RX 637 (ALT 250 FOR IP): Performed by: NURSE PRACTITIONER

## 2025-08-21 PROCEDURE — 6360000002 HC RX W HCPCS: Performed by: NURSE PRACTITIONER

## 2025-08-21 PROCEDURE — 96372 THER/PROPH/DIAG INJ SC/IM: CPT

## 2025-08-21 PROCEDURE — 99284 EMERGENCY DEPT VISIT MOD MDM: CPT

## 2025-08-21 RX ORDER — AMOXICILLIN 500 MG/1
500 CAPSULE ORAL 2 TIMES DAILY
Qty: 20 CAPSULE | Refills: 0 | Status: SHIPPED | OUTPATIENT
Start: 2025-08-21 | End: 2025-08-31

## 2025-08-21 RX ORDER — HYDROCODONE BITARTRATE AND ACETAMINOPHEN 5; 325 MG/1; MG/1
1 TABLET ORAL EVERY 6 HOURS PRN
Qty: 8 TABLET | Refills: 0 | Status: SHIPPED | OUTPATIENT
Start: 2025-08-21 | End: 2025-08-23

## 2025-08-21 RX ORDER — KETOROLAC TROMETHAMINE 30 MG/ML
30 INJECTION, SOLUTION INTRAMUSCULAR; INTRAVENOUS ONCE
Status: COMPLETED | OUTPATIENT
Start: 2025-08-21 | End: 2025-08-21

## 2025-08-21 RX ORDER — ETODOLAC 300 MG/1
300 CAPSULE ORAL EVERY 8 HOURS
Qty: 30 CAPSULE | Refills: 0 | Status: SHIPPED | OUTPATIENT
Start: 2025-08-21

## 2025-08-21 RX ADMIN — Medication 5 ML: at 02:00

## 2025-08-21 RX ADMIN — KETOROLAC TROMETHAMINE 30 MG: 30 INJECTION, SOLUTION INTRAMUSCULAR at 01:58

## 2025-08-21 ASSESSMENT — PAIN SCALES - GENERAL: PAINLEVEL_OUTOF10: 10

## 2025-08-21 ASSESSMENT — PAIN - FUNCTIONAL ASSESSMENT: PAIN_FUNCTIONAL_ASSESSMENT: 0-10
